# Patient Record
Sex: FEMALE | Race: WHITE | NOT HISPANIC OR LATINO | Employment: OTHER | URBAN - METROPOLITAN AREA
[De-identification: names, ages, dates, MRNs, and addresses within clinical notes are randomized per-mention and may not be internally consistent; named-entity substitution may affect disease eponyms.]

---

## 2023-03-13 LAB
THYROTROPIN (MIU/L) IN SER/PLAS BY DETECTION LIMIT <= 0.05 MIU/L: 4.01 MIU/L (ref 0.44–3.98)
THYROXINE (T4) FREE (NG/DL) IN SER/PLAS: 0.85 NG/DL (ref 0.61–1.12)

## 2023-04-18 ENCOUNTER — OFFICE VISIT (OUTPATIENT)
Dept: PRIMARY CARE | Facility: CLINIC | Age: 78
End: 2023-04-18
Payer: MEDICARE

## 2023-04-18 VITALS
HEART RATE: 91 BPM | DIASTOLIC BLOOD PRESSURE: 71 MMHG | BODY MASS INDEX: 38.23 KG/M2 | WEIGHT: 215.8 LBS | OXYGEN SATURATION: 95 % | SYSTOLIC BLOOD PRESSURE: 155 MMHG | TEMPERATURE: 98 F

## 2023-04-18 DIAGNOSIS — R60.0 BILATERAL LOWER EXTREMITY EDEMA: ICD-10-CM

## 2023-04-18 DIAGNOSIS — B35.4 TINEA CORPORIS: ICD-10-CM

## 2023-04-18 DIAGNOSIS — M25.511 CHRONIC RIGHT SHOULDER PAIN: Primary | ICD-10-CM

## 2023-04-18 DIAGNOSIS — G89.29 CHRONIC RIGHT SHOULDER PAIN: Primary | ICD-10-CM

## 2023-04-18 DIAGNOSIS — R06.02 SHORT OF BREATH ON EXERTION: ICD-10-CM

## 2023-04-18 PROCEDURE — 1160F RVW MEDS BY RX/DR IN RCRD: CPT | Performed by: NURSE PRACTITIONER

## 2023-04-18 PROCEDURE — 1159F MED LIST DOCD IN RCRD: CPT | Performed by: NURSE PRACTITIONER

## 2023-04-18 PROCEDURE — 99214 OFFICE O/P EST MOD 30 MIN: CPT | Performed by: NURSE PRACTITIONER

## 2023-04-18 PROCEDURE — 1036F TOBACCO NON-USER: CPT | Performed by: NURSE PRACTITIONER

## 2023-04-18 RX ORDER — ALBUTEROL SULFATE 0.83 MG/ML
SOLUTION RESPIRATORY (INHALATION)
COMMUNITY
Start: 2019-10-07

## 2023-04-18 RX ORDER — FLUTICASONE FUROATE, UMECLIDINIUM BROMIDE AND VILANTEROL TRIFENATATE 200; 62.5; 25 UG/1; UG/1; UG/1
POWDER RESPIRATORY (INHALATION)
COMMUNITY
Start: 2023-02-15 | End: 2024-01-02

## 2023-04-18 RX ORDER — TRIAMTERENE/HYDROCHLOROTHIAZID 37.5-25 MG
1 TABLET ORAL DAILY
COMMUNITY
Start: 2021-02-03 | End: 2023-05-09

## 2023-04-18 RX ORDER — ALBUTEROL SULFATE 90 UG/1
AEROSOL, METERED RESPIRATORY (INHALATION)
COMMUNITY
Start: 2017-11-30 | End: 2024-04-02 | Stop reason: ALTCHOICE

## 2023-04-18 RX ORDER — HYDROXYZINE HYDROCHLORIDE 25 MG/1
TABLET, FILM COATED ORAL
COMMUNITY
Start: 2022-06-20 | End: 2024-01-02 | Stop reason: WASHOUT

## 2023-04-18 RX ORDER — GLUCOSAMINE HCL 500 MG
TABLET ORAL
COMMUNITY
Start: 2021-02-03

## 2023-04-18 RX ORDER — LANOLIN ALCOHOL/MO/W.PET/CERES
1 CREAM (GRAM) TOPICAL DAILY
COMMUNITY

## 2023-04-18 RX ORDER — ACETAMINOPHEN 500 MG
TABLET ORAL
COMMUNITY
Start: 2021-02-03

## 2023-04-18 RX ORDER — OMEPRAZOLE 20 MG/1
TABLET, DELAYED RELEASE ORAL
COMMUNITY
End: 2023-08-02 | Stop reason: SDUPTHER

## 2023-04-18 RX ORDER — SERTRALINE HYDROCHLORIDE 50 MG/1
1 TABLET, FILM COATED ORAL DAILY
COMMUNITY
Start: 2017-01-19 | End: 2023-05-18

## 2023-04-18 RX ORDER — NYSTATIN 100000 [USP'U]/G
POWDER TOPICAL 2 TIMES DAILY
Qty: 60 G | Refills: 2 | Status: SHIPPED | OUTPATIENT
Start: 2023-04-18 | End: 2023-06-17

## 2023-04-18 RX ORDER — SIMVASTATIN 40 MG/1
1 TABLET, FILM COATED ORAL DAILY
COMMUNITY
Start: 2014-10-28 | End: 2023-08-31 | Stop reason: SDUPTHER

## 2023-04-18 ASSESSMENT — ENCOUNTER SYMPTOMS
NAUSEA: 0
SHORTNESS OF BREATH: 1
PALPITATIONS: 0
COUGH: 1
VOMITING: 0
WHEEZING: 0
CONSTIPATION: 0

## 2023-04-18 NOTE — PATIENT INSTRUCTIONS
Continue medications as prescribed.  Healthy diet and drink plenty of water.  Please schedule all necessary health screenings ophthalmology and dentist.    Follow-up in 3 months.  Call office any new concerns.  Nystop powder to [place] area   Lot #: [id number]

## 2023-04-18 NOTE — PROGRESS NOTES
Subjective   Patient ID: Alyssa Pruett is a 77 y.o. female who presents for Follow-up (Concerned about weight gain, knows is due for CPE).    HPI     Patient presents to clinic for follow-up visit.    Today patient complains of shortness of breathing on exertion and swelling in lower legs.  Patient with history of emphysema being followed by pulmonary medicine every 3 months.  Patient currently on oxygen at 3 L per nasal cannula.  She states she was advised by pulmonary medicine list to see cardiology but never followed up.  She denies chest pains.    Today patient also complains of chronic right shoulder pain for several years.  Denies trauma or injury to the shoulder but does have pain with movement of the shoulder.      Patient also complains of rash lower abdomen, under the apron.    Appetite Normal Bowel and bladder normal.    Patient states she had mammogram completed outside of  February 2023.  Results were normal.    Has had recent lab work January 2023      Review of Systems   Respiratory:  Positive for cough and shortness of breath. Negative for wheezing.         See HPI   Cardiovascular:  Positive for leg swelling. Negative for chest pain and palpitations.   Gastrointestinal:  Negative for constipation, nausea and vomiting.   Musculoskeletal:         See HPI       Objective   /71 (BP Location: Left arm, Patient Position: Sitting, BP Cuff Size: Large adult)   Pulse 91   Temp 36.7 °C (98 °F) (Temporal)   Wt 97.9 kg (215 lb 12.8 oz)   SpO2 95%   BMI 38.23 kg/m²     Physical Exam  Constitutional:       General: She is not in acute distress.  HENT:      Right Ear: There is impacted cerumen.      Left Ear: Tympanic membrane, ear canal and external ear normal. There is no impacted cerumen.      Mouth/Throat:      Mouth: Mucous membranes are moist.   Eyes:      Extraocular Movements: Extraocular movements intact.   Cardiovascular:      Rate and Rhythm: Normal rate and regular rhythm.   Pulmonary:       Effort: Pulmonary effort is normal. No respiratory distress.      Comments: Lung sounds clear but diminished throughout.  Wearing O2 at 2 L per nasal cannula.  Abdominal:      Palpations: Abdomen is soft.      Tenderness: There is no abdominal tenderness. There is no guarding or rebound.      Comments: Obese abdomen   Musculoskeletal:      Comments: Right shoulder decreased range of motion with extension of right arm.  Neurovascular intact   Skin:     General: Skin is warm and dry.      Comments: Fungal rash noted apron of the abdomen some erythema noted.  No signs of infection.   Neurological:      Mental Status: She is alert and oriented to person, place, and time.   Psychiatric:         Mood and Affect: Mood normal.         Assessment/Plan   Problem List Items Addressed This Visit    None  Visit Diagnoses       Chronic right shoulder pain    -  Primary    Relevant Orders    XR shoulder right 2+ views  Topical pain med as needed.    Short of breath on exertion        Relevant Orders    Referral to Cardiology    ECG 12 lead (Clinic Performed)-SR with ST wave elevation.  O2 at 2L/NC    Tinea corporis        Relevant Medications    nystatin (Mycostatin) 100,000 unit/gram powder    Bilateral lower extremity edema        Relevant Orders    Referral to Cardiology    ECG 12 lead (Clinic Performed)               Compression socks               Leg elevation

## 2023-04-20 ENCOUNTER — TELEPHONE (OUTPATIENT)
Dept: PRIMARY CARE | Facility: CLINIC | Age: 78
End: 2023-04-20
Payer: MEDICARE

## 2023-04-20 NOTE — TELEPHONE ENCOUNTER
Phone Patient discussed results of x-ray.  Patient will follow up with her established orthopedic provider.

## 2023-05-09 DIAGNOSIS — I10 HYPERTENSION, UNSPECIFIED TYPE: Primary | ICD-10-CM

## 2023-05-09 RX ORDER — TRIAMTERENE/HYDROCHLOROTHIAZID 37.5-25 MG
TABLET ORAL
Qty: 90 TABLET | Refills: 2 | Status: SHIPPED | OUTPATIENT
Start: 2023-05-09 | End: 2024-02-27

## 2023-08-02 DIAGNOSIS — K21.9 GASTROESOPHAGEAL REFLUX DISEASE WITHOUT ESOPHAGITIS: Primary | ICD-10-CM

## 2023-08-02 NOTE — TELEPHONE ENCOUNTER
Rx Refill Request Telephone Encounter    Name:  Alyssa Pruett  :  [id number]  Medication Name:  omeprazole DR 20 mg capsule  Specific Pharmacy location:    [facility] - [facility] [address]  [address]  [facility] [address]  Phone: [phone] Fax: [phone]  Date of last appointment:  2023  Date of next appointment:  None scheduled  Best number to reach patient:  [phone]

## 2023-08-04 RX ORDER — OMEPRAZOLE 20 MG/1
20 TABLET, DELAYED RELEASE ORAL DAILY
Qty: 90 TABLET | Refills: 0 | Status: SHIPPED | OUTPATIENT
Start: 2023-08-04 | End: 2023-10-20

## 2023-08-28 ENCOUNTER — TELEPHONE (OUTPATIENT)
Dept: PRIMARY CARE | Facility: CLINIC | Age: 78
End: 2023-08-28

## 2023-08-28 NOTE — TELEPHONE ENCOUNTER
Called pt to schedule annual medicare wellness and pt said she will call back after her apptointments this month

## 2023-08-31 DIAGNOSIS — E78.5 HYPERLIPIDEMIA, UNSPECIFIED HYPERLIPIDEMIA TYPE: Primary | ICD-10-CM

## 2023-08-31 RX ORDER — SIMVASTATIN 40 MG/1
40 TABLET, FILM COATED ORAL DAILY
Qty: 90 TABLET | Refills: 0 | Status: SHIPPED | OUTPATIENT
Start: 2023-08-31 | End: 2023-10-20

## 2023-08-31 NOTE — TELEPHONE ENCOUNTER
Rx Refill Request Telephone Encounter    Name:  Alyssa Pruett  :  [id number]  Medication Name:  simvastatin 40 mg tablet  Specific Pharmacy location:    [facility] - [facility] [address]  [address]  [facility] [address]  Phone: [phone] Fax: [phone]  Date of last appointment:  2023  Date of next appointment:  None scheduled  Best number to reach patient:  [phone] (home)

## 2023-10-02 DIAGNOSIS — F41.9 ANXIETY: ICD-10-CM

## 2023-10-02 RX ORDER — SERTRALINE HYDROCHLORIDE 50 MG/1
TABLET, FILM COATED ORAL
Qty: 90 TABLET | Refills: 1 | Status: SHIPPED | OUTPATIENT
Start: 2023-10-02

## 2023-10-03 ENCOUNTER — APPOINTMENT (OUTPATIENT)
Dept: CARDIOLOGY | Facility: HOSPITAL | Age: 78
End: 2023-10-03
Payer: MEDICARE

## 2023-10-06 ENCOUNTER — APPOINTMENT (OUTPATIENT)
Dept: CARDIOLOGY | Facility: HOSPITAL | Age: 78
End: 2023-10-06
Payer: MEDICARE

## 2023-10-06 ENCOUNTER — HOSPITAL ENCOUNTER (OUTPATIENT)
Dept: RADIOLOGY | Facility: HOSPITAL | Age: 78
End: 2023-10-06
Payer: MEDICARE

## 2023-10-06 ENCOUNTER — APPOINTMENT (OUTPATIENT)
Dept: RADIOLOGY | Facility: HOSPITAL | Age: 78
End: 2023-10-06
Payer: MEDICARE

## 2023-10-06 ENCOUNTER — HOSPITAL ENCOUNTER (OUTPATIENT)
Dept: CARDIOLOGY | Facility: CLINIC | Age: 78
Discharge: HOME | End: 2023-10-06
Payer: MEDICARE

## 2023-10-06 VITALS
SYSTOLIC BLOOD PRESSURE: 134 MMHG | BODY MASS INDEX: 38.45 KG/M2 | WEIGHT: 217 LBS | HEIGHT: 63 IN | DIASTOLIC BLOOD PRESSURE: 82 MMHG

## 2023-10-06 DIAGNOSIS — R06.00 DYSPNEA, UNSPECIFIED: ICD-10-CM

## 2023-10-06 DIAGNOSIS — R53.83 OTHER FATIGUE: ICD-10-CM

## 2023-10-08 LAB — EJECTION FRACTION APICAL 4 CHAMBER: 56.6

## 2023-10-19 DIAGNOSIS — K21.9 GASTROESOPHAGEAL REFLUX DISEASE WITHOUT ESOPHAGITIS: ICD-10-CM

## 2023-10-19 DIAGNOSIS — E78.5 HYPERLIPIDEMIA, UNSPECIFIED HYPERLIPIDEMIA TYPE: ICD-10-CM

## 2023-10-20 RX ORDER — OMEPRAZOLE 20 MG/1
CAPSULE, DELAYED RELEASE ORAL
Qty: 90 CAPSULE | Refills: 1 | Status: SHIPPED | OUTPATIENT
Start: 2023-10-20 | End: 2024-04-24 | Stop reason: SDUPTHER

## 2023-10-20 RX ORDER — SIMVASTATIN 40 MG/1
40 TABLET, FILM COATED ORAL DAILY
Qty: 90 TABLET | Refills: 1 | Status: SHIPPED | OUTPATIENT
Start: 2023-10-20 | End: 2024-04-17

## 2023-12-13 ENCOUNTER — TELEPHONE (OUTPATIENT)
Dept: PRIMARY CARE | Facility: CLINIC | Age: 78
End: 2023-12-13
Payer: MEDICARE

## 2023-12-21 PROBLEM — I10 HTN (HYPERTENSION): Status: ACTIVE | Noted: 2023-12-21

## 2023-12-21 PROBLEM — L30.9 DERMATITIS: Status: ACTIVE | Noted: 2023-12-21

## 2023-12-21 PROBLEM — G62.9 POLYNEUROPATHY: Status: ACTIVE | Noted: 2023-12-21

## 2023-12-21 PROBLEM — K22.70 BARRETT'S ESOPHAGUS: Status: ACTIVE | Noted: 2023-12-21

## 2023-12-21 PROBLEM — R60.0 EDEMA OF BOTH LEGS: Status: ACTIVE | Noted: 2023-12-21

## 2023-12-21 PROBLEM — F17.200 SMOKER: Status: ACTIVE | Noted: 2023-12-21

## 2023-12-21 PROBLEM — M79.604 PAIN OF RIGHT LOWER EXTREMITY: Status: ACTIVE | Noted: 2023-12-21

## 2023-12-21 PROBLEM — F41.9 ANXIETY: Status: ACTIVE | Noted: 2023-12-21

## 2023-12-21 PROBLEM — J43.9 COPD (CHRONIC OBSTRUCTIVE PULMONARY DISEASE) WITH EMPHYSEMA (MULTI): Status: ACTIVE | Noted: 2023-12-21

## 2023-12-21 PROBLEM — J31.0 CHRONIC RHINITIS: Status: ACTIVE | Noted: 2023-12-21

## 2023-12-21 PROBLEM — R30.0 DYSURIA: Status: ACTIVE | Noted: 2023-12-21

## 2023-12-21 PROBLEM — R14.0 ABDOMINAL BLOATING: Status: ACTIVE | Noted: 2023-12-21

## 2023-12-21 PROBLEM — N63.0 BREAST NODULE: Status: ACTIVE | Noted: 2023-12-21

## 2023-12-21 PROBLEM — E55.9 VITAMIN D DEFICIENCY: Status: ACTIVE | Noted: 2023-12-21

## 2023-12-21 PROBLEM — L29.9 ITCHING: Status: ACTIVE | Noted: 2023-12-21

## 2023-12-21 PROBLEM — F43.20 GRIEF REACTION: Status: ACTIVE | Noted: 2023-12-21

## 2023-12-21 PROBLEM — R73.9 HYPERGLYCEMIA: Status: ACTIVE | Noted: 2023-12-21

## 2023-12-21 PROBLEM — H90.3 BILATERAL SENSORINEURAL HEARING LOSS: Status: ACTIVE | Noted: 2023-12-21

## 2023-12-21 PROBLEM — R27.0 ATAXIA: Status: ACTIVE | Noted: 2023-12-21

## 2023-12-21 PROBLEM — F43.21 GRIEF REACTION: Status: ACTIVE | Noted: 2023-12-21

## 2023-12-21 PROBLEM — K21.9 GERD (GASTROESOPHAGEAL REFLUX DISEASE): Status: ACTIVE | Noted: 2023-12-21

## 2023-12-21 PROBLEM — L30.4 INTERTRIGO: Status: ACTIVE | Noted: 2023-12-21

## 2023-12-21 PROBLEM — H93.12: Status: ACTIVE | Noted: 2023-12-21

## 2023-12-21 PROBLEM — I72.0 CAROTID ANEURYSM, RIGHT (CMS-HCC): Status: ACTIVE | Noted: 2023-12-21

## 2023-12-21 PROBLEM — J44.9 COPD (CHRONIC OBSTRUCTIVE PULMONARY DISEASE) (MULTI): Chronic | Status: ACTIVE | Noted: 2023-12-21

## 2023-12-21 PROBLEM — E78.00 PURE HYPERCHOLESTEROLEMIA: Status: ACTIVE | Noted: 2023-12-21

## 2023-12-21 PROBLEM — R23.3 PETECHIAE: Status: ACTIVE | Noted: 2023-12-21

## 2023-12-21 PROBLEM — F32.A DEPRESSION: Status: ACTIVE | Noted: 2023-12-21

## 2023-12-21 PROBLEM — R06.00 DYSPNEA: Status: ACTIVE | Noted: 2023-12-21

## 2023-12-21 PROBLEM — E78.00 PURE HYPERCHOLESTEROLEMIA: Chronic | Status: ACTIVE | Noted: 2023-12-21

## 2023-12-21 PROBLEM — R74.8 ELEVATED LIVER ENZYMES: Status: ACTIVE | Noted: 2023-12-21

## 2023-12-21 PROBLEM — R53.83 FATIGUE: Status: ACTIVE | Noted: 2023-12-21

## 2023-12-21 PROBLEM — L91.8 SKIN TAG: Status: ACTIVE | Noted: 2023-12-21

## 2023-12-21 PROBLEM — I67.1 CEREBRAL ANEURYSM (HHS-HCC): Status: ACTIVE | Noted: 2023-12-21

## 2023-12-21 PROBLEM — M25.50 ARTHRALGIA: Status: ACTIVE | Noted: 2023-12-21

## 2023-12-21 PROBLEM — I72.9 ANEURYSM (CMS-HCC): Status: ACTIVE | Noted: 2023-12-21

## 2024-01-01 PROBLEM — I72.0 CAROTID ANEURYSM, RIGHT (CMS-HCC): Chronic | Status: ACTIVE | Noted: 2023-12-21

## 2024-01-01 PROBLEM — R60.0 EDEMA OF BOTH LEGS: Chronic | Status: ACTIVE | Noted: 2023-12-21

## 2024-01-01 PROBLEM — I10 HTN (HYPERTENSION): Chronic | Status: ACTIVE | Noted: 2023-12-21

## 2024-01-01 NOTE — PROGRESS NOTES
Referred by No ref. provider found    HPI I am seeing Jailyn in follow-up.  She continues to have shortness of breath.  Not very much activity.  Weight is up only 3 pounds over 6 months given the holidays.  Minimal lower extremity edema.    Past Medical History:  Problem List Items Addressed This Visit    None       Past Medical History:   Diagnosis Date    Candidal stomatitis     Thrush    Chronic obstructive pulmonary disease with (acute) exacerbation (CMS/[facility]) 10/22/2019    COPD exacerbation    Chronic obstructive pulmonary disease with (acute) exacerbation (CMS/[facility]) 11/06/2014    COPD with exacerbation    COPD (chronic obstructive pulmonary disease) (CMS/[facility]) 12/21/2023    On 3L O2    Dysphagia, unspecified     Dysphagia    Encounter for other screening for malignant neoplasm of breast 01/19/2017    Encounter for screening breast examination    Impacted cerumen, bilateral 08/23/2018    Impacted cerumen of both ears    Impacted cerumen, right ear 01/30/2017    Impacted cerumen of right ear    Low back pain, unspecified 11/02/2018    Acute right-sided low back pain without sciatica    Low back pain, unspecified 10/02/2018    Acute left-sided low back pain without sciatica    Nicotine dependence, unspecified, uncomplicated 11/06/2014    Smoker    Nicotine dependence, unspecified, uncomplicated 11/30/2017    Tobacco dependence    Nonscarring hair loss, unspecified 12/14/2018    Hair loss    Other specified symptoms and signs involving the circulatory and respiratory systems 10/07/2019    Chest congestion    Pain in right knee 12/14/2018    Acute pain of right knee    Pain in unspecified shoulder 06/05/2015    Shoulder pain    Personal history of other diseases of the musculoskeletal system and connective tissue     History of low back pain    Personal history of other diseases of the respiratory system 10/07/2019    History of acute bronchitis    Personal history of other diseases of the respiratory system      History of bronchitis    Personal history of other diseases of the respiratory system 2018    History of paranasal sinus congestion    Personal history of other diseases of the respiratory system 2017    History of pharyngitis    Personal history of other diseases of the respiratory system 2014    History of chronic obstructive lung disease    Personal history of other drug therapy 10/06/2014    History of influenza vaccination    Personal history of other endocrine, nutritional and metabolic disease 10/28/2014    History of hypercholesterolemia    Personal history of other endocrine, nutritional and metabolic disease     History of high cholesterol    Personal history of other infectious and parasitic diseases 2018    History of herpes zoster    Personal history of other infectious and parasitic diseases 2018    History of herpes zoster    Personal history of other medical treatment 2017    History of screening mammography    Personal history of other medical treatment 2021    History of screening mammography    Personal history of other specified conditions 2018    History of dizziness    Personal history of other specified conditions 2015    History of abdominal pain    Personal history of other specified conditions 2018    History of epistaxis    Personal history of other specified conditions     History of insomnia    Pure hypercholesterolemia 2023    PCP follows    Sudden idiopathic hearing loss, left ear 2017    Sudden hearing loss, left    Urinary tract infection, site not specified     UTI (lower urinary tract infection)             Past Surgical History:  She has a past surgical history that includes Foot surgery (2017); Colonoscopy (06/10/2021); Cataract extraction (2018); Other surgical history (2014);  section, classic (2014); and Knee surgery (2014).      Social History:  She reports that she  quit smoking about 7 years ago. Her smoking use included cigarettes. She smoked an average of 1 pack per day. She has never used smokeless tobacco. No history on file for alcohol use and drug use.    Family History:  No family history on file.     Allergies:  Patient has no known allergies.    Outpatient Medications:  Current Outpatient Medications   Medication Instructions    albuterol 2.5 mg /3 mL (0.083 %) nebulizer solution inhalation    ascorbic acid/bioflavonoids (TAYLOR-C PO) 1 tablet, oral, Daily    cholecalciferol (Vitamin D-3) 50 mcg (2,000 unit) capsule oral    cyanocobalamin (Vitamin B-12) 1,000 mcg tablet 1 tablet, oral, Daily    hydrOXYzine HCL (Atarax) 25 mg tablet oral    omeprazole (PriLOSEC) 20 mg DR capsule TAKE 1 CAPSULE EVERY DAY EARLY IN THE MORNING    sertraline (Zoloft) 50 mg tablet TAKE 1 TABLET EVERY DAY    simvastatin (ZOCOR) 40 mg, oral, Daily    Trelegy Ellipta 200-62.5-25 mcg blister with device     triamterene-hydrochlorothiazid (Maxzide-25) 37.5-25 mg tablet TAKE 1 TABLET EVERY DAY    ubidecarenone (coenzyme Q10) 100 mg tablet oral    Ventolin HFA 90 mcg/actuation inhaler inhalation, Every 4 hours RT        Last Recorded Vitals:  There were no vitals filed for this visit.    Physical Exam    Physical  Patient is alert and oriented x3.  HEENT is unremarkable mucous members are moist  Neck no JVP no bruits upstrokes are full no thyromegaly  Lungs are clear bilaterally.  No wheezing crackles or rales  Heart regular rhythm normal S1-S2 there is no S3 no murmurs are heard.  Abdomen is soft vessels are positive nontender nondistended no organomegaly no pulsatile masses  Extremities trace edema  Neuro is grossly nonfocal  Skin has no rashes     Last Labs:  CBC -  Lab Results   Component Value Date    WBC 6.6 01/27/2023    HGB 13.4 01/27/2023    HCT 41.8 01/27/2023    MCV 83 01/27/2023     01/27/2023       CMP -  Lab Results   Component Value Date    CALCIUM 9.3 01/27/2023    PROT 7.5  01/27/2023    ALBUMIN 4.4 01/27/2023    AST 68 (H) 01/27/2023    ALT 72 (H) 01/27/2023    ALKPHOS 59 01/27/2023    BILITOT 0.5 01/27/2023       LIPID PANEL -   Lab Results   Component Value Date    CHOL 162 01/27/2023    HDL 45.5 01/27/2023    CHHDL 3.6 01/27/2023    VLDL 28 01/27/2023    TRIG 139 01/27/2023       RENAL FUNCTION PANEL -   Lab Results   Component Value Date    K 3.8 01/27/2023       Lab Results   Component Value Date    HGBA1C 5.8 (A) 10/22/2021     Procedure    Echo 10/6/2023 EF 60% DDF      CT [05/24/2023]: No ev/of acute intrathoracic process. Emphysematous changes most conspicuous/severe in the upper lobes. Scattered areas of atelectasis/scarring, most conspicuous in the lung bases. No new areas of consolidation. No sizable effusion or pneumothorax. No new suspicious pulmonary nodule or mass.      PHARM NST [09/26/2019]: Normal â€“ 68%      ECHO [09/26/2019]: EF 60-65%. SD = impaired relaxation pattern.          Assessment/Plan   1. Shortness of breath. More likely not the home O2 requiring COPD. However, there is a strong family history premature coronary disease, she also has hyperlipidemia and a smoking history.  She has not yet had her regadenoson nuclear stress test completed.  This to be scheduled as late in the afternoon as possible.  Her echo does reveal normal LV function.  There is no evidence of pulmonary hypertension.      2. Lower extreme edema.  This is multifactorial I think in part related to the fact that she is in a wheelchair and not as active.  No clear pulmonary hypertension on her echo.  Today the edema is minimal.      3. Hyperlipidemia. Managed by primary care physician. Her LDL is 89. At this point this appears to be appropriate target for her.     Her echo was reviewed.  LV function normal.  No pulmonary hypertension.  Increasing activity however she can is important.  She will be ordered to have a regadenoson nuclear stress test.  She will call us 1 week afterwards to  review the results.  My plan is to see her back in approximately 6 months    Nayely Merrill MD     Instructions and follow up

## 2024-01-02 ENCOUNTER — OFFICE VISIT (OUTPATIENT)
Dept: CARDIOLOGY | Facility: CLINIC | Age: 79
End: 2024-01-02
Payer: MEDICARE

## 2024-01-02 VITALS
SYSTOLIC BLOOD PRESSURE: 156 MMHG | BODY MASS INDEX: 38.97 KG/M2 | DIASTOLIC BLOOD PRESSURE: 84 MMHG | OXYGEN SATURATION: 92 % | HEART RATE: 104 BPM | WEIGHT: 220 LBS

## 2024-01-02 DIAGNOSIS — E78.00 PURE HYPERCHOLESTEROLEMIA: Chronic | ICD-10-CM

## 2024-01-02 DIAGNOSIS — R60.0 EDEMA OF BOTH LEGS: Chronic | ICD-10-CM

## 2024-01-02 DIAGNOSIS — I10 PRIMARY HYPERTENSION: Primary | Chronic | ICD-10-CM

## 2024-01-02 DIAGNOSIS — F17.200 SMOKER: ICD-10-CM

## 2024-01-02 PROCEDURE — 99214 OFFICE O/P EST MOD 30 MIN: CPT | Performed by: INTERNAL MEDICINE

## 2024-01-02 PROCEDURE — 1159F MED LIST DOCD IN RCRD: CPT | Performed by: INTERNAL MEDICINE

## 2024-01-02 PROCEDURE — 3077F SYST BP >= 140 MM HG: CPT | Performed by: INTERNAL MEDICINE

## 2024-01-02 PROCEDURE — 3079F DIAST BP 80-89 MM HG: CPT | Performed by: INTERNAL MEDICINE

## 2024-01-02 PROCEDURE — 1160F RVW MEDS BY RX/DR IN RCRD: CPT | Performed by: INTERNAL MEDICINE

## 2024-01-02 PROCEDURE — 1036F TOBACCO NON-USER: CPT | Performed by: INTERNAL MEDICINE

## 2024-01-02 NOTE — PATIENT INSTRUCTIONS
1. Shortness of breath. More likely not the home O2 requiring COPD. However, there is a strong family history premature coronary disease, she also has hyperlipidemia and a smoking history.  She has not yet had her regadenoson nuclear stress test completed.  This to be scheduled as late in the afternoon as possible.  Her echo does reveal normal LV function.  There is no evidence of pulmonary hypertension.      2. Lower extreme edema.  This is multifactorial I think in part related to the fact that she is in a wheelchair and not as active.  No clear pulmonary hypertension on her echo.  Today the edema is minimal.      3. Hyperlipidemia. Managed by primary care physician. Her LDL is 89. At this point this appears to be appropriate target for her.     Her echo was reviewed.  LV function normal.  No pulmonary hypertension.  Increasing activity however she can is important.  She will be ordered to have a regadenoson nuclear stress test.  She will call us 1 week afterwards to review the results.  My plan is to see her back in approximately 6 months

## 2024-01-12 ENCOUNTER — TELEPHONE (OUTPATIENT)
Dept: PRIMARY CARE | Facility: CLINIC | Age: 79
End: 2024-01-12
Payer: MEDICARE

## 2024-02-06 ENCOUNTER — HOSPITAL ENCOUNTER (OUTPATIENT)
Dept: RADIOLOGY | Facility: CLINIC | Age: 79
Discharge: HOME | End: 2024-02-06
Payer: MEDICARE

## 2024-02-06 ENCOUNTER — HOSPITAL ENCOUNTER (OUTPATIENT)
Dept: CARDIOLOGY | Facility: CLINIC | Age: 79
Discharge: HOME | End: 2024-02-06
Payer: MEDICARE

## 2024-02-06 DIAGNOSIS — R06.00 DYSPNEA: Primary | ICD-10-CM

## 2024-02-06 DIAGNOSIS — I10 HTN (HYPERTENSION): Chronic | ICD-10-CM

## 2024-02-06 DIAGNOSIS — R53.83 OTHER FATIGUE: ICD-10-CM

## 2024-02-06 DIAGNOSIS — E55.9 VITAMIN D DEFICIENCY: ICD-10-CM

## 2024-02-06 DIAGNOSIS — I10 HTN (HYPERTENSION): Primary | Chronic | ICD-10-CM

## 2024-02-06 DIAGNOSIS — R06.00 DYSPNEA, UNSPECIFIED: ICD-10-CM

## 2024-02-06 DIAGNOSIS — F17.200 SMOKER: ICD-10-CM

## 2024-02-06 DIAGNOSIS — R06.02 SHORTNESS OF BREATH: ICD-10-CM

## 2024-02-06 PROCEDURE — 78452 HT MUSCLE IMAGE SPECT MULT: CPT | Performed by: INTERNAL MEDICINE

## 2024-02-06 PROCEDURE — 2500000004 HC RX 250 GENERAL PHARMACY W/ HCPCS (ALT 636 FOR OP/ED): Performed by: INTERNAL MEDICINE

## 2024-02-06 PROCEDURE — 93017 CV STRESS TEST TRACING ONLY: CPT

## 2024-02-06 PROCEDURE — 93016 CV STRESS TEST SUPVJ ONLY: CPT | Performed by: INTERNAL MEDICINE

## 2024-02-06 PROCEDURE — 93017 CV STRESS TEST TRACING ONLY: CPT | Mod: MUE

## 2024-02-06 PROCEDURE — 78452 HT MUSCLE IMAGE SPECT MULT: CPT

## 2024-02-06 RX ORDER — REGADENOSON 0.08 MG/ML
0.4 INJECTION, SOLUTION INTRAVENOUS ONCE
Status: COMPLETED | OUTPATIENT
Start: 2024-02-06 | End: 2024-02-06

## 2024-02-06 RX ADMIN — REGADENOSON 0.4 MG: 0.4 INJECTION INTRAVENOUS at 12:22

## 2024-02-25 DIAGNOSIS — I10 HYPERTENSION, UNSPECIFIED TYPE: ICD-10-CM

## 2024-02-27 RX ORDER — TRIAMTERENE/HYDROCHLOROTHIAZID 37.5-25 MG
TABLET ORAL
Qty: 90 TABLET | Refills: 3 | Status: SHIPPED | OUTPATIENT
Start: 2024-02-27

## 2024-04-02 ENCOUNTER — OFFICE VISIT (OUTPATIENT)
Dept: PRIMARY CARE | Facility: CLINIC | Age: 79
End: 2024-04-02
Payer: MEDICARE

## 2024-04-02 VITALS
HEART RATE: 110 BPM | OXYGEN SATURATION: 94 % | BODY MASS INDEX: 38.8 KG/M2 | HEIGHT: 63 IN | SYSTOLIC BLOOD PRESSURE: 142 MMHG | WEIGHT: 219 LBS | DIASTOLIC BLOOD PRESSURE: 68 MMHG

## 2024-04-02 DIAGNOSIS — R06.02 SHORT OF BREATH ON EXERTION: ICD-10-CM

## 2024-04-02 DIAGNOSIS — J44.9 CHRONIC OBSTRUCTIVE PULMONARY DISEASE, UNSPECIFIED COPD TYPE (MULTI): Chronic | ICD-10-CM

## 2024-04-02 DIAGNOSIS — Z13.31 DEPRESSION SCREENING: ICD-10-CM

## 2024-04-02 DIAGNOSIS — R26.89 BALANCE DISORDER: ICD-10-CM

## 2024-04-02 DIAGNOSIS — R73.03 PREDIABETES: ICD-10-CM

## 2024-04-02 DIAGNOSIS — L30.9 ECZEMA, UNSPECIFIED TYPE: ICD-10-CM

## 2024-04-02 DIAGNOSIS — I10 PRIMARY HYPERTENSION: Chronic | ICD-10-CM

## 2024-04-02 DIAGNOSIS — Z00.00 MEDICARE ANNUAL WELLNESS VISIT, SUBSEQUENT: Primary | ICD-10-CM

## 2024-04-02 DIAGNOSIS — M25.511 CHRONIC RIGHT SHOULDER PAIN: ICD-10-CM

## 2024-04-02 DIAGNOSIS — G89.29 CHRONIC RIGHT SHOULDER PAIN: ICD-10-CM

## 2024-04-02 DIAGNOSIS — E66.01 CLASS 2 SEVERE OBESITY DUE TO EXCESS CALORIES WITH SERIOUS COMORBIDITY AND BODY MASS INDEX (BMI) OF 38.0 TO 38.9 IN ADULT (MULTI): ICD-10-CM

## 2024-04-02 DIAGNOSIS — Z13.29 THYROID DISORDER SCREENING: ICD-10-CM

## 2024-04-02 PROCEDURE — 1124F ACP DISCUSS-NO DSCNMKR DOCD: CPT | Performed by: STUDENT IN AN ORGANIZED HEALTH CARE EDUCATION/TRAINING PROGRAM

## 2024-04-02 PROCEDURE — 3077F SYST BP >= 140 MM HG: CPT | Performed by: STUDENT IN AN ORGANIZED HEALTH CARE EDUCATION/TRAINING PROGRAM

## 2024-04-02 PROCEDURE — G0444 DEPRESSION SCREEN ANNUAL: HCPCS | Performed by: STUDENT IN AN ORGANIZED HEALTH CARE EDUCATION/TRAINING PROGRAM

## 2024-04-02 PROCEDURE — 99214 OFFICE O/P EST MOD 30 MIN: CPT | Performed by: STUDENT IN AN ORGANIZED HEALTH CARE EDUCATION/TRAINING PROGRAM

## 2024-04-02 PROCEDURE — 1160F RVW MEDS BY RX/DR IN RCRD: CPT | Performed by: STUDENT IN AN ORGANIZED HEALTH CARE EDUCATION/TRAINING PROGRAM

## 2024-04-02 PROCEDURE — G0439 PPPS, SUBSEQ VISIT: HCPCS | Performed by: STUDENT IN AN ORGANIZED HEALTH CARE EDUCATION/TRAINING PROGRAM

## 2024-04-02 PROCEDURE — 1159F MED LIST DOCD IN RCRD: CPT | Performed by: STUDENT IN AN ORGANIZED HEALTH CARE EDUCATION/TRAINING PROGRAM

## 2024-04-02 PROCEDURE — 3078F DIAST BP <80 MM HG: CPT | Performed by: STUDENT IN AN ORGANIZED HEALTH CARE EDUCATION/TRAINING PROGRAM

## 2024-04-02 PROCEDURE — 1170F FXNL STATUS ASSESSED: CPT | Performed by: STUDENT IN AN ORGANIZED HEALTH CARE EDUCATION/TRAINING PROGRAM

## 2024-04-02 RX ORDER — CLOBETASOL PROPIONATE 0.5 MG/G
OINTMENT TOPICAL 2 TIMES DAILY
Qty: 15 G | Refills: 2 | Status: SHIPPED | OUTPATIENT
Start: 2024-04-02 | End: 2025-04-02

## 2024-04-02 RX ORDER — BUDESONIDE, GLYCOPYRROLATE, AND FORMOTEROL FUMARATE 160; 9; 4.8 UG/1; UG/1; UG/1
AEROSOL, METERED RESPIRATORY (INHALATION)
COMMUNITY
Start: 2023-07-03

## 2024-04-02 ASSESSMENT — PATIENT HEALTH QUESTIONNAIRE - PHQ9
2. FEELING DOWN, DEPRESSED OR HOPELESS: NOT AT ALL
SUM OF ALL RESPONSES TO PHQ9 QUESTIONS 1 AND 2: 0
1. LITTLE INTEREST OR PLEASURE IN DOING THINGS: NOT AT ALL

## 2024-04-02 ASSESSMENT — ACTIVITIES OF DAILY LIVING (ADL)
GROCERY_SHOPPING: NEEDS ASSISTANCE
MANAGING_FINANCES: INDEPENDENT
DRESSING: INDEPENDENT
BATHING: INDEPENDENT
TAKING_MEDICATION: INDEPENDENT
DOING_HOUSEWORK: NEEDS ASSISTANCE

## 2024-04-02 NOTE — PROGRESS NOTES
Subjective   Patient ID: Alyssa Pruett is a 78 y.o. female who presents for Medicare Annual Wellness Visit Subsequent (Medicare annual wellness visit, would like to get established /Needs bloodwork , due for bone density and mammogram).  Establishing care.     Says she is miserable when she tries to move. Feels short of breath. Is on oxygen, breztri, and albuterol.     Sees pulmonology regularly. Started NAC at recommendation of family member.     7-8 years ago she lost her hearing overnight. Has had balance issues since.     Has 2 brain aneurysms that were found incidentally. Has not followed up on these as she states she knows this will not change anything and that they are not repairable.     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     Says she sleeps well.     Using a walker.     Wants to incorporate more walking.     No other concerns today.         Review of Systems   Constitutional:  Positive for fatigue.   HENT: Negative.     Respiratory:  Positive for shortness of breath.    Cardiovascular: Negative.    Gastrointestinal: Negative.    Musculoskeletal:  Positive for arthralgias.   Neurological: Negative.    Psychiatric/Behavioral:  Negative for sleep disturbance.    All other systems reviewed and are negative.      Objective   Physical Exam  Vitals reviewed.   Constitutional:       Appearance: Normal appearance.   HENT:      Head: Normocephalic.      Right Ear: External ear normal.      Left Ear: External ear normal.      Mouth/Throat:      Mouth: Mucous membranes are moist.   Eyes:      Pupils: Pupils are equal, round, and reactive to light.   Cardiovascular:      Rate and Rhythm: Normal rate and regular rhythm.   Pulmonary:      Effort: Pulmonary effort is normal.      Breath sounds: Wheezing present. No rhonchi.   Abdominal:      Palpations: Abdomen is soft.   Musculoskeletal:         General: Normal range of motion.   Skin:     General: Skin is warm and dry.   Neurological:      General: No focal  deficit present.      Mental Status: She is alert. Mental status is at baseline.   Psychiatric:         Mood and Affect: Mood normal.         Behavior: Behavior normal.         Body mass index is 38.79 kg/m².      Current Outpatient Medications:     albuterol 2.5 mg /3 mL (0.083 %) nebulizer solution, Inhale., Disp: , Rfl:     ascorbic acid/bioflavonoids (TAYLOR-C PO), Take 1 tablet by mouth once daily., Disp: , Rfl:     budesonide-glycopyr-formoterol (Breztri Aerosphere) 160-9-4.8 mcg/actuation HFA aerosol inhaler, , Disp: , Rfl:     cholecalciferol (Vitamin D-3) 50 mcg (2,000 unit) capsule, Take by mouth., Disp: , Rfl:     cyanocobalamin (Vitamin B-12) 1,000 mcg tablet, Take 1 tablet (1,000 mcg) by mouth once daily., Disp: , Rfl:     omeprazole (PriLOSEC) 20 mg DR capsule, TAKE 1 CAPSULE EVERY DAY EARLY IN THE MORNING, Disp: 90 capsule, Rfl: 1    sertraline (Zoloft) 50 mg tablet, TAKE 1 TABLET EVERY DAY, Disp: 90 tablet, Rfl: 1    simvastatin (Zocor) 40 mg tablet, TAKE 1 TABLET (40 MG) BY MOUTH ONCE DAILY., Disp: 90 tablet, Rfl: 1    triamterene-hydrochlorothiazid (Maxzide-25) 37.5-25 mg tablet, TAKE 1 TABLET EVERY DAY, Disp: 90 tablet, Rfl: 3    ubidecarenone (coenzyme Q10) 100 mg tablet, Take by mouth., Disp: , Rfl:     ALBUTEROL SULFATE INHL, Inhale 90 mcg., Disp: , Rfl:     clobetasol (Temovate) 0.05 % ointment, Apply topically 2 times a day., Disp: 15 g, Rfl: 2      Assessment/Plan   Diagnoses and all orders for this visit:  Medicare annual wellness visit, subsequent  Comments:  UTD on vaccines  discuss DEXA next visit  Eczema, unspecified type  -     clobetasol (Temovate) 0.05 % ointment; Apply topically 2 times a day.  Primary hypertension  -     Comprehensive metabolic panel; Future  -     CBC and Auto Differential; Future  Short of breath on exertion  Comments:  discussed slowly increasing exercise tolerance  continue following with pulmonologist  Thyroid disorder screening  -     Thyroid Peroxidase (TPO)  Antibody; Future  -     Tsh With Reflex To Free T4 If Abnormal; Future  Prediabetes  -     Hemoglobin A1c; Future  Depression screening  Comments:  PHQ-2 completed on rooming  Class 2 severe obesity due to excess calories with serious comorbidity and body mass index (BMI) of 38.0 to 38.9 in adult (CMS/[facility])  Chronic right shoulder pain  Chronic obstructive pulmonary disease, unspecified COPD type (CMS/[facility])  Comments:  on optimal therapy  chronic supplemental oxygen dependence  continue seeing pulm  Balance disorder  Comments:  did balance PT  uses walker to ambulate    Follow up in 3 months       Farnaz Helm DO 04/05/24 10:53 PM

## 2024-04-04 ASSESSMENT — PATIENT HEALTH QUESTIONNAIRE - PHQ9
2. FEELING DOWN, DEPRESSED OR HOPELESS: NOT AT ALL
1. LITTLE INTEREST OR PLEASURE IN DOING THINGS: NOT AT ALL
SUM OF ALL RESPONSES TO PHQ9 QUESTIONS 1 AND 2: 0

## 2024-04-05 ASSESSMENT — ENCOUNTER SYMPTOMS
ARTHRALGIAS: 1
FATIGUE: 1
SLEEP DISTURBANCE: 0
GASTROINTESTINAL NEGATIVE: 1
SHORTNESS OF BREATH: 1
CARDIOVASCULAR NEGATIVE: 1
NEUROLOGICAL NEGATIVE: 1

## 2024-04-06 ENCOUNTER — LAB (OUTPATIENT)
Dept: LAB | Facility: LAB | Age: 79
End: 2024-04-06
Payer: MEDICARE

## 2024-04-06 DIAGNOSIS — D64.9 ANEMIA, UNSPECIFIED TYPE: ICD-10-CM

## 2024-04-06 DIAGNOSIS — I10 PRIMARY HYPERTENSION: Chronic | ICD-10-CM

## 2024-04-06 DIAGNOSIS — R73.03 PREDIABETES: ICD-10-CM

## 2024-04-06 DIAGNOSIS — Z13.29 THYROID DISORDER SCREENING: ICD-10-CM

## 2024-04-06 LAB
ALBUMIN SERPL BCP-MCNC: 4.3 G/DL (ref 3.4–5)
ALP SERPL-CCNC: 62 U/L (ref 33–136)
ALT SERPL W P-5'-P-CCNC: 46 U/L (ref 7–45)
ANION GAP SERPL CALC-SCNC: 15 MMOL/L (ref 10–20)
AST SERPL W P-5'-P-CCNC: 46 U/L (ref 9–39)
BASOPHILS # BLD AUTO: 0.05 X10*3/UL (ref 0–0.1)
BASOPHILS NFR BLD AUTO: 0.7 %
BILIRUB SERPL-MCNC: 0.4 MG/DL (ref 0–1.2)
BUN SERPL-MCNC: 14 MG/DL (ref 6–23)
CALCIUM SERPL-MCNC: 9.8 MG/DL (ref 8.6–10.6)
CHLORIDE SERPL-SCNC: 99 MMOL/L (ref 98–107)
CO2 SERPL-SCNC: 31 MMOL/L (ref 21–32)
CREAT SERPL-MCNC: 0.92 MG/DL (ref 0.5–1.05)
EGFRCR SERPLBLD CKD-EPI 2021: 63 ML/MIN/1.73M*2
EOSINOPHIL # BLD AUTO: 0.09 X10*3/UL (ref 0–0.4)
EOSINOPHIL NFR BLD AUTO: 1.3 %
ERYTHROCYTE [DISTWIDTH] IN BLOOD BY AUTOMATED COUNT: 15.7 % (ref 11.5–14.5)
EST. AVERAGE GLUCOSE BLD GHB EST-MCNC: 134 MG/DL
GLUCOSE SERPL-MCNC: 152 MG/DL (ref 74–99)
HBA1C MFR BLD: 6.3 %
HCT VFR BLD AUTO: 35.9 % (ref 36–46)
HGB BLD-MCNC: 10.9 G/DL (ref 12–16)
IMM GRANULOCYTES # BLD AUTO: 0.01 X10*3/UL (ref 0–0.5)
IMM GRANULOCYTES NFR BLD AUTO: 0.1 % (ref 0–0.9)
LYMPHOCYTES # BLD AUTO: 1.19 X10*3/UL (ref 0.8–3)
LYMPHOCYTES NFR BLD AUTO: 16.9 %
MCH RBC QN AUTO: 22.2 PG (ref 26–34)
MCHC RBC AUTO-ENTMCNC: 30.4 G/DL (ref 32–36)
MCV RBC AUTO: 73 FL (ref 80–100)
MONOCYTES # BLD AUTO: 0.39 X10*3/UL (ref 0.05–0.8)
MONOCYTES NFR BLD AUTO: 5.5 %
NEUTROPHILS # BLD AUTO: 5.32 X10*3/UL (ref 1.6–5.5)
NEUTROPHILS NFR BLD AUTO: 75.5 %
NRBC BLD-RTO: 0 /100 WBCS (ref 0–0)
PLATELET # BLD AUTO: 224 X10*3/UL (ref 150–450)
POTASSIUM SERPL-SCNC: 4.1 MMOL/L (ref 3.5–5.3)
PROT SERPL-MCNC: 7.1 G/DL (ref 6.4–8.2)
RBC # BLD AUTO: 4.9 X10*6/UL (ref 4–5.2)
SODIUM SERPL-SCNC: 141 MMOL/L (ref 136–145)
T4 FREE SERPL-MCNC: 1.01 NG/DL (ref 0.78–1.48)
THYROPEROXIDASE AB SERPL-ACNC: 50 IU/ML
TSH SERPL-ACNC: 4.88 MIU/L (ref 0.44–3.98)
WBC # BLD AUTO: 7.1 X10*3/UL (ref 4.4–11.3)

## 2024-04-06 PROCEDURE — 86376 MICROSOMAL ANTIBODY EACH: CPT

## 2024-04-06 PROCEDURE — 84443 ASSAY THYROID STIM HORMONE: CPT

## 2024-04-06 PROCEDURE — 83036 HEMOGLOBIN GLYCOSYLATED A1C: CPT

## 2024-04-06 PROCEDURE — 85025 COMPLETE CBC W/AUTO DIFF WBC: CPT

## 2024-04-06 PROCEDURE — 80053 COMPREHEN METABOLIC PANEL: CPT

## 2024-04-06 PROCEDURE — 84439 ASSAY OF FREE THYROXINE: CPT

## 2024-04-06 PROCEDURE — 83540 ASSAY OF IRON: CPT

## 2024-04-06 PROCEDURE — 83550 IRON BINDING TEST: CPT

## 2024-04-06 PROCEDURE — 36415 COLL VENOUS BLD VENIPUNCTURE: CPT

## 2024-04-09 ENCOUNTER — PATIENT MESSAGE (OUTPATIENT)
Dept: PRIMARY CARE | Facility: CLINIC | Age: 79
End: 2024-04-09
Payer: MEDICARE

## 2024-04-09 DIAGNOSIS — D64.9 ANEMIA, UNSPECIFIED TYPE: Primary | ICD-10-CM

## 2024-04-09 DIAGNOSIS — Z12.31 BREAST CANCER SCREENING BY MAMMOGRAM: ICD-10-CM

## 2024-04-09 LAB
IRON SATN MFR SERPL: ABNORMAL %
IRON SERPL-MCNC: 40 UG/DL (ref 35–150)
TIBC SERPL-MCNC: ABNORMAL UG/DL
UIBC SERPL-MCNC: >450 UG/DL (ref 110–370)

## 2024-04-24 ENCOUNTER — PATIENT MESSAGE (OUTPATIENT)
Dept: PRIMARY CARE | Facility: CLINIC | Age: 79
End: 2024-04-24
Payer: MEDICARE

## 2024-04-24 DIAGNOSIS — K21.9 GASTROESOPHAGEAL REFLUX DISEASE WITHOUT ESOPHAGITIS: ICD-10-CM

## 2024-04-24 RX ORDER — OMEPRAZOLE 20 MG/1
CAPSULE, DELAYED RELEASE ORAL
Qty: 90 CAPSULE | Refills: 1 | Status: SHIPPED | OUTPATIENT
Start: 2024-04-24

## 2024-04-24 NOTE — TELEPHONE ENCOUNTER
From: Alyssa Pruett  To: Farnaz Helm  Sent: 4/24/2024 10:10 AM EDT  Subject: Medication refill    Good Morning! I need a refill authorization for this medication! omeprazole 20 mg DR capsule  [facility]  Thank you!

## 2024-06-17 PROBLEM — G47.33 OBSTRUCTIVE SLEEP APNEA: Status: ACTIVE | Noted: 2024-06-17

## 2024-06-17 PROBLEM — I70.0 ATHEROSCLEROSIS OF AORTA (CMS-HCC): Status: ACTIVE | Noted: 2023-05-24

## 2024-06-17 PROBLEM — M19.011 PRIMARY OSTEOARTHRITIS, RIGHT SHOULDER: Status: ACTIVE | Noted: 2023-04-18

## 2024-06-17 PROBLEM — M79.601 CHRONIC PAIN OF RIGHT UPPER EXTREMITY: Status: ACTIVE | Noted: 2023-04-18

## 2024-06-17 PROBLEM — R09.89 PULMONARY CONGESTION: Status: ACTIVE | Noted: 2024-06-17

## 2024-06-17 PROBLEM — N39.0 LOWER URINARY TRACT INFECTIOUS DISEASE: Status: ACTIVE | Noted: 2024-06-17

## 2024-06-17 PROBLEM — G89.29 CHRONIC PAIN OF RIGHT UPPER EXTREMITY: Status: ACTIVE | Noted: 2023-04-18

## 2024-06-17 PROBLEM — R26.89 IMPAIRMENT OF BALANCE: Status: ACTIVE | Noted: 2024-06-17

## 2024-06-17 PROBLEM — R09.81 CONGESTION OF PARANASAL SINUS: Status: ACTIVE | Noted: 2024-06-17

## 2024-06-17 PROBLEM — J20.9 ACUTE BRONCHITIS: Status: ACTIVE | Noted: 2024-06-17

## 2024-06-17 PROBLEM — R13.10 DYSPHAGIA: Status: ACTIVE | Noted: 2024-06-17

## 2024-06-17 PROBLEM — R42 DIZZINESS: Status: ACTIVE | Noted: 2024-06-17

## 2024-06-17 PROBLEM — R91.1 SOLITARY PULMONARY NODULE: Status: ACTIVE | Noted: 2023-05-24

## 2024-06-17 PROBLEM — B37.0 CANDIDIASIS OF MOUTH: Status: ACTIVE | Noted: 2024-06-17

## 2024-07-01 PROBLEM — R23.3 PETECHIAE: Status: RESOLVED | Noted: 2023-12-21 | Resolved: 2024-07-01

## 2024-07-01 PROBLEM — R09.81 CONGESTION OF PARANASAL SINUS: Status: RESOLVED | Noted: 2024-06-17 | Resolved: 2024-07-01

## 2024-07-01 PROBLEM — L29.9 ITCHING: Status: RESOLVED | Noted: 2023-12-21 | Resolved: 2024-07-01

## 2024-07-01 PROBLEM — J31.0 CHRONIC RHINITIS: Status: RESOLVED | Noted: 2023-12-21 | Resolved: 2024-07-01

## 2024-07-01 PROBLEM — F43.21 GRIEF REACTION: Status: RESOLVED | Noted: 2023-12-21 | Resolved: 2024-07-01

## 2024-07-01 PROBLEM — H90.3 BILATERAL SENSORINEURAL HEARING LOSS: Status: RESOLVED | Noted: 2023-12-21 | Resolved: 2024-07-01

## 2024-07-01 PROBLEM — G89.29 CHRONIC PAIN OF RIGHT UPPER EXTREMITY: Status: RESOLVED | Noted: 2023-04-18 | Resolved: 2024-07-01

## 2024-07-01 PROBLEM — B37.0 CANDIDIASIS OF MOUTH: Status: RESOLVED | Noted: 2024-06-17 | Resolved: 2024-07-01

## 2024-07-01 PROBLEM — K21.9 GERD (GASTROESOPHAGEAL REFLUX DISEASE): Status: RESOLVED | Noted: 2023-12-21 | Resolved: 2024-07-01

## 2024-07-01 PROBLEM — G47.33 OBSTRUCTIVE SLEEP APNEA: Chronic | Status: ACTIVE | Noted: 2024-06-17

## 2024-07-01 PROBLEM — F43.20 GRIEF REACTION: Status: RESOLVED | Noted: 2023-12-21 | Resolved: 2024-07-01

## 2024-07-01 PROBLEM — M79.601 CHRONIC PAIN OF RIGHT UPPER EXTREMITY: Status: RESOLVED | Noted: 2023-04-18 | Resolved: 2024-07-01

## 2024-07-01 PROBLEM — R06.02 SHORTNESS OF BREATH: Status: ACTIVE | Noted: 2023-12-21

## 2024-07-01 PROBLEM — N63.0 BREAST NODULE: Status: RESOLVED | Noted: 2023-12-21 | Resolved: 2024-07-01

## 2024-07-01 PROBLEM — L30.4 INTERTRIGO: Status: RESOLVED | Noted: 2023-12-21 | Resolved: 2024-07-01

## 2024-07-01 PROBLEM — L91.8 SKIN TAG: Status: RESOLVED | Noted: 2023-12-21 | Resolved: 2024-07-01

## 2024-07-01 PROBLEM — R74.8 ELEVATED LIVER ENZYMES: Status: RESOLVED | Noted: 2023-12-21 | Resolved: 2024-07-01

## 2024-07-01 PROBLEM — L30.9 DERMATITIS: Status: RESOLVED | Noted: 2023-12-21 | Resolved: 2024-07-01

## 2024-07-01 PROBLEM — H93.12 TINNITUS OF LEFT EAR: Status: RESOLVED | Noted: 2023-12-21 | Resolved: 2024-07-01

## 2024-07-01 PROBLEM — M25.50 ARTHRALGIA: Status: RESOLVED | Noted: 2023-12-21 | Resolved: 2024-07-01

## 2024-07-01 PROBLEM — R14.0 ABDOMINAL BLOATING: Status: RESOLVED | Noted: 2023-12-21 | Resolved: 2024-07-01

## 2024-07-01 PROBLEM — N39.0 LOWER URINARY TRACT INFECTIOUS DISEASE: Status: RESOLVED | Noted: 2024-06-17 | Resolved: 2024-07-01

## 2024-07-01 PROBLEM — R30.0 DYSURIA: Status: RESOLVED | Noted: 2023-12-21 | Resolved: 2024-07-01

## 2024-07-02 ENCOUNTER — APPOINTMENT (OUTPATIENT)
Dept: CARDIOLOGY | Facility: CLINIC | Age: 79
End: 2024-07-02
Payer: MEDICARE

## 2024-10-02 ENCOUNTER — APPOINTMENT (OUTPATIENT)
Dept: PRIMARY CARE | Facility: CLINIC | Age: 79
End: 2024-10-02
Payer: MEDICARE

## 2024-10-02 VITALS
DIASTOLIC BLOOD PRESSURE: 70 MMHG | OXYGEN SATURATION: 95 % | WEIGHT: 206 LBS | HEIGHT: 63 IN | SYSTOLIC BLOOD PRESSURE: 140 MMHG | BODY MASS INDEX: 36.5 KG/M2 | HEART RATE: 89 BPM

## 2024-10-02 DIAGNOSIS — R06.02 SHORT OF BREATH ON EXERTION: ICD-10-CM

## 2024-10-02 DIAGNOSIS — I10 HYPERTENSION, UNSPECIFIED TYPE: ICD-10-CM

## 2024-10-02 DIAGNOSIS — L29.9 PRURITUS: ICD-10-CM

## 2024-10-02 DIAGNOSIS — E66.812 CLASS 2 SEVERE OBESITY WITH SERIOUS COMORBIDITY AND BODY MASS INDEX (BMI) OF 36.0 TO 36.9 IN ADULT, UNSPECIFIED OBESITY TYPE: ICD-10-CM

## 2024-10-02 DIAGNOSIS — L85.3 DRY SKIN DERMATITIS: Primary | ICD-10-CM

## 2024-10-02 DIAGNOSIS — K21.9 GASTROESOPHAGEAL REFLUX DISEASE WITHOUT ESOPHAGITIS: ICD-10-CM

## 2024-10-02 DIAGNOSIS — T78.40XS ALLERGY, SEQUELA: ICD-10-CM

## 2024-10-02 DIAGNOSIS — Z23 ENCOUNTER FOR IMMUNIZATION: ICD-10-CM

## 2024-10-02 DIAGNOSIS — L30.9 ECZEMA, UNSPECIFIED TYPE: ICD-10-CM

## 2024-10-02 DIAGNOSIS — I70.0 ATHEROSCLEROSIS OF AORTA (CMS-HCC): ICD-10-CM

## 2024-10-02 DIAGNOSIS — Z13.29 THYROID DISORDER SCREENING: ICD-10-CM

## 2024-10-02 DIAGNOSIS — E66.01 CLASS 2 SEVERE OBESITY WITH SERIOUS COMORBIDITY AND BODY MASS INDEX (BMI) OF 36.0 TO 36.9 IN ADULT, UNSPECIFIED OBESITY TYPE: ICD-10-CM

## 2024-10-02 DIAGNOSIS — E61.1 LOW IRON: ICD-10-CM

## 2024-10-02 DIAGNOSIS — J30.2 SEASONAL ALLERGIES: ICD-10-CM

## 2024-10-02 DIAGNOSIS — F32.5 MAJOR DEPRESSIVE DISORDER, SINGLE EPISODE, IN FULL REMISSION (CMS-HCC): ICD-10-CM

## 2024-10-02 DIAGNOSIS — L98.9 SKIN LESIONS: ICD-10-CM

## 2024-10-02 DIAGNOSIS — J44.9 CHRONIC OBSTRUCTIVE PULMONARY DISEASE, UNSPECIFIED COPD TYPE (MULTI): Chronic | ICD-10-CM

## 2024-10-02 DIAGNOSIS — R73.9 HYPERGLYCEMIA: ICD-10-CM

## 2024-10-02 LAB
ALBUMIN SERPL BCP-MCNC: 4.3 G/DL (ref 3.4–5)
ALP SERPL-CCNC: 62 U/L (ref 33–136)
ALT SERPL W P-5'-P-CCNC: 32 U/L (ref 7–45)
ANION GAP SERPL CALC-SCNC: 9 MMOL/L (ref 10–20)
AST SERPL W P-5'-P-CCNC: 34 U/L (ref 9–39)
BASOPHILS # BLD AUTO: 0.05 X10*3/UL (ref 0–0.1)
BASOPHILS NFR BLD AUTO: 0.5 %
BILIRUB SERPL-MCNC: 0.4 MG/DL (ref 0–1.2)
BUN SERPL-MCNC: 16 MG/DL (ref 6–23)
CALCIUM SERPL-MCNC: 9.6 MG/DL (ref 8.6–10.6)
CHLORIDE SERPL-SCNC: 99 MMOL/L (ref 98–107)
CO2 SERPL-SCNC: 34 MMOL/L (ref 21–32)
CREAT SERPL-MCNC: 0.99 MG/DL (ref 0.5–1.05)
EGFRCR SERPLBLD CKD-EPI 2021: 58 ML/MIN/1.73M*2
EOSINOPHIL # BLD AUTO: 0.06 X10*3/UL (ref 0–0.4)
EOSINOPHIL NFR BLD AUTO: 0.6 %
ERYTHROCYTE [DISTWIDTH] IN BLOOD BY AUTOMATED COUNT: 15.9 % (ref 11.5–14.5)
EST. AVERAGE GLUCOSE BLD GHB EST-MCNC: 123 MG/DL
GLUCOSE SERPL-MCNC: 73 MG/DL (ref 74–99)
HBA1C MFR BLD: 5.9 %
HCT VFR BLD AUTO: 41.2 % (ref 36–46)
HGB BLD-MCNC: 12.3 G/DL (ref 12–16)
IMM GRANULOCYTES # BLD AUTO: 0.02 X10*3/UL (ref 0–0.5)
IMM GRANULOCYTES NFR BLD AUTO: 0.2 % (ref 0–0.9)
IRON SATN MFR SERPL: ABNORMAL %
IRON SERPL-MCNC: 60 UG/DL (ref 35–150)
LYMPHOCYTES # BLD AUTO: 2.17 X10*3/UL (ref 0.8–3)
LYMPHOCYTES NFR BLD AUTO: 22.7 %
MCH RBC QN AUTO: 24.5 PG (ref 26–34)
MCHC RBC AUTO-ENTMCNC: 29.9 G/DL (ref 32–36)
MCV RBC AUTO: 82 FL (ref 80–100)
MONOCYTES # BLD AUTO: 0.55 X10*3/UL (ref 0.05–0.8)
MONOCYTES NFR BLD AUTO: 5.7 %
NEUTROPHILS # BLD AUTO: 6.72 X10*3/UL (ref 1.6–5.5)
NEUTROPHILS NFR BLD AUTO: 70.3 %
NRBC BLD-RTO: 0 /100 WBCS (ref 0–0)
PLATELET # BLD AUTO: 252 X10*3/UL (ref 150–450)
POTASSIUM SERPL-SCNC: 3.8 MMOL/L (ref 3.5–5.3)
PROT SERPL-MCNC: 7.3 G/DL (ref 6.4–8.2)
RBC # BLD AUTO: 5.03 X10*6/UL (ref 4–5.2)
SODIUM SERPL-SCNC: 138 MMOL/L (ref 136–145)
T4 FREE SERPL-MCNC: 1.15 NG/DL (ref 0.78–1.48)
TIBC SERPL-MCNC: ABNORMAL UG/DL
TSH SERPL-ACNC: 4.76 MIU/L (ref 0.44–3.98)
UIBC SERPL-MCNC: >450 UG/DL (ref 110–370)
WBC # BLD AUTO: 9.6 X10*3/UL (ref 4.4–11.3)

## 2024-10-02 PROCEDURE — 99214 OFFICE O/P EST MOD 30 MIN: CPT | Performed by: STUDENT IN AN ORGANIZED HEALTH CARE EDUCATION/TRAINING PROGRAM

## 2024-10-02 PROCEDURE — 1159F MED LIST DOCD IN RCRD: CPT | Performed by: STUDENT IN AN ORGANIZED HEALTH CARE EDUCATION/TRAINING PROGRAM

## 2024-10-02 PROCEDURE — 1160F RVW MEDS BY RX/DR IN RCRD: CPT | Performed by: STUDENT IN AN ORGANIZED HEALTH CARE EDUCATION/TRAINING PROGRAM

## 2024-10-02 PROCEDURE — 3078F DIAST BP <80 MM HG: CPT | Performed by: STUDENT IN AN ORGANIZED HEALTH CARE EDUCATION/TRAINING PROGRAM

## 2024-10-02 PROCEDURE — G0008 ADMIN INFLUENZA VIRUS VAC: HCPCS | Performed by: STUDENT IN AN ORGANIZED HEALTH CARE EDUCATION/TRAINING PROGRAM

## 2024-10-02 PROCEDURE — 90662 IIV NO PRSV INCREASED AG IM: CPT | Performed by: STUDENT IN AN ORGANIZED HEALTH CARE EDUCATION/TRAINING PROGRAM

## 2024-10-02 PROCEDURE — 3077F SYST BP >= 140 MM HG: CPT | Performed by: STUDENT IN AN ORGANIZED HEALTH CARE EDUCATION/TRAINING PROGRAM

## 2024-10-02 RX ORDER — OMEPRAZOLE 20 MG/1
CAPSULE, DELAYED RELEASE ORAL
Qty: 90 CAPSULE | Refills: 1 | Status: SHIPPED | OUTPATIENT
Start: 2024-10-02

## 2024-10-02 RX ORDER — TRIAMTERENE/HYDROCHLOROTHIAZID 37.5-25 MG
1 TABLET ORAL DAILY
Qty: 90 TABLET | Refills: 3 | Status: SHIPPED | OUTPATIENT
Start: 2024-10-02

## 2024-10-02 RX ORDER — TOPIRAMATE 25 MG/1
25 TABLET ORAL 2 TIMES DAILY
Qty: 60 TABLET | Refills: 5 | Status: SHIPPED | OUTPATIENT
Start: 2024-10-02 | End: 2025-03-31

## 2024-10-02 ASSESSMENT — PATIENT HEALTH QUESTIONNAIRE - PHQ9
1. LITTLE INTEREST OR PLEASURE IN DOING THINGS: NOT AT ALL
SUM OF ALL RESPONSES TO PHQ9 QUESTIONS 1 AND 2: 0
2. FEELING DOWN, DEPRESSED OR HOPELESS: NOT AT ALL

## 2024-10-02 NOTE — PROGRESS NOTES
Subjective   Patient ID: Alyssa Pruett is a 78 y.o. female who presents for Follow-up (Follow up visit /Skin has been very itchy, and bruising easily after itching. Wonders if she is allergic to her cat- her daughter also has 3 dogs ( just moved in with her daughter) but she tends to stay in her room ).  Moved in with her daughter, this has been an adjustment but overall an improvement.     Frustrated with her weight. Wants to lose weight. Not able to exercise due to her breathing. Interested in trying topamax.     Easy bruising. Has not been taking supplemental iron.     Reports has been having more itching. A couple lesions on her arms. Her daughter has 3 dogs. She is not sure if this is aggravating allergies.     Would like flu shot today.     No other concerns today.         Review of Systems   Constitutional:  Positive for fatigue.   HENT: Negative.     Respiratory:  Positive for shortness of breath.    Gastrointestinal: Negative.    Skin:  Positive for rash.   Neurological: Negative.    Hematological:  Bruises/bleeds easily.   All other systems reviewed and are negative.      Objective   Physical Exam  Vitals reviewed.   Constitutional:       Appearance: Normal appearance.   HENT:      Head: Normocephalic.   Eyes:      Pupils: Pupils are equal, round, and reactive to light.   Skin:     Findings: Lesion (left upper extremity and right wrist with pale pink scaly lesions) present.   Neurological:      General: No focal deficit present.      Mental Status: She is alert. Mental status is at baseline.   Psychiatric:         Mood and Affect: Mood normal.         Behavior: Behavior normal.         Body mass index is 36.49 kg/m².      Current Outpatient Medications:     albuterol 2.5 mg /3 mL (0.083 %) nebulizer solution, Inhale., Disp: , Rfl:     ALBUTEROL SULFATE INHL, Inhale 90 mcg., Disp: , Rfl:     ascorbic acid/bioflavonoids (TAYLOR-C PO), Take 1 tablet by mouth once daily., Disp: , Rfl:      budesonide-glycopyr-formoterol (Breztri Aerosphere) 160-9-4.8 mcg/actuation HFA aerosol inhaler, , Disp: , Rfl:     cholecalciferol (Vitamin D-3) 50 mcg (2,000 unit) capsule, Take by mouth., Disp: , Rfl:     clobetasol (Temovate) 0.05 % ointment, Apply topically 2 times a day., Disp: 15 g, Rfl: 2    cyanocobalamin (Vitamin B-12) 1,000 mcg tablet, Take 1 tablet (1,000 mcg) by mouth once daily., Disp: , Rfl:     sertraline (Zoloft) 50 mg tablet, TAKE 1 TABLET EVERY DAY, Disp: 90 tablet, Rfl: 1    simvastatin (Zocor) 40 mg tablet, TAKE 1 TABLET (40 MG) BY MOUTH ONCE DAILY., Disp: 90 tablet, Rfl: 1    ubidecarenone (coenzyme Q10) 100 mg tablet, Take by mouth., Disp: , Rfl:     omeprazole (PriLOSEC) 20 mg DR capsule, TAKE 1 CAPSULE EVERY DAY EARLY IN THE MORNING, Disp: 90 capsule, Rfl: 1    topiramate (Topamax) 25 mg tablet, Take 1 tablet (25 mg) by mouth 2 times a day., Disp: 60 tablet, Rfl: 5    triamterene-hydrochlorothiazid (Maxzide-25) 37.5-25 mg tablet, Take 1 tablet by mouth once daily., Disp: 90 tablet, Rfl: 3    Assessment/Plan   Diagnoses and all orders for this visit:  Dry skin dermatitis  -     Referral to Dermatology  Hypertension, unspecified type  -     triamterene-hydrochlorothiazid (Maxzide-25) 37.5-25 mg tablet; Take 1 tablet by mouth once daily.  Gastroesophageal reflux disease without esophagitis  -     omeprazole (PriLOSEC) 20 mg DR capsule; TAKE 1 CAPSULE EVERY DAY EARLY IN THE MORNING  Skin lesions  -     Referral to Dermatology  Short of breath on exertion  Class 2 severe obesity with serious comorbidity and body mass index (BMI) of 36.0 to 36.9 in adult, unspecified obesity type  -     topiramate (Topamax) 25 mg tablet; Take 1 tablet (25 mg) by mouth 2 times a day.  Encounter for immunization  -     Flu vaccine, trivalent, preservative free, HIGH-DOSE, age 65y+ (Fluzone)  Eczema, unspecified type  Hyperglycemia  -     Hemoglobin A1c  -     Comprehensive metabolic panel  Chronic obstructive  pulmonary disease, unspecified COPD type (Multi)  Thyroid disorder screening  -     TSH with reflex to Free T4 if abnormal  -     Thyroxine, Free  Low iron  -     CBC and Auto Differential  -     Iron and TIBC  Pruritus  Allergy, sequela  -     Respiratory Allergy Profile IgE  Seasonal allergies  -     Respiratory Allergy Profile IgE  Major depressive disorder, single episode, in full remission (CMS-HCC)  Atherosclerosis of aorta (CMS-HCC)       Follow up in 3 months    Farnaz Helm DO 10/04/24 10:30 PM

## 2024-10-03 LAB

## 2024-10-04 PROBLEM — F32.5 MAJOR DEPRESSIVE DISORDER, SINGLE EPISODE, IN FULL REMISSION (CMS-HCC): Status: ACTIVE | Noted: 2024-10-04

## 2024-10-04 ASSESSMENT — ENCOUNTER SYMPTOMS
NEUROLOGICAL NEGATIVE: 1
SHORTNESS OF BREATH: 1
BRUISES/BLEEDS EASILY: 1
GASTROINTESTINAL NEGATIVE: 1
FATIGUE: 1

## 2024-10-09 PROBLEM — E66.812 CLASS 2 OBESITY WITH BODY MASS INDEX (BMI) OF 36.0 TO 36.9 IN ADULT: Status: ACTIVE | Noted: 2024-10-09

## 2024-10-09 PROBLEM — I72.0 CAROTID ANEURYSM, RIGHT (CMS-HCC): Chronic | Status: RESOLVED | Noted: 2023-12-21 | Resolved: 2024-10-09

## 2024-10-25 ENCOUNTER — APPOINTMENT (OUTPATIENT)
Dept: DERMATOLOGY | Facility: CLINIC | Age: 79
End: 2024-10-25
Payer: MEDICARE

## 2024-10-30 PROBLEM — R73.9 HYPERGLYCEMIA: Status: RESOLVED | Noted: 2023-12-21 | Resolved: 2024-10-30

## 2024-10-30 PROBLEM — I67.1 CEREBRAL ANEURYSM (HHS-HCC): Chronic | Status: ACTIVE | Noted: 2023-12-21

## 2024-10-30 PROBLEM — M79.604 PAIN OF RIGHT LOWER EXTREMITY: Status: RESOLVED | Noted: 2023-12-21 | Resolved: 2024-10-30

## 2024-10-30 PROBLEM — R13.10 DYSPHAGIA: Status: RESOLVED | Noted: 2024-06-17 | Resolved: 2024-10-30

## 2024-10-30 PROBLEM — F32.A DEPRESSION: Status: RESOLVED | Noted: 2023-12-21 | Resolved: 2024-10-30

## 2024-10-30 PROBLEM — R09.89 PULMONARY CONGESTION: Status: RESOLVED | Noted: 2024-06-17 | Resolved: 2024-10-30

## 2024-10-30 PROBLEM — M19.011 PRIMARY OSTEOARTHRITIS, RIGHT SHOULDER: Status: RESOLVED | Noted: 2023-04-18 | Resolved: 2024-10-30

## 2024-10-31 ENCOUNTER — APPOINTMENT (OUTPATIENT)
Dept: CARDIOLOGY | Facility: CLINIC | Age: 79
End: 2024-10-31
Payer: MEDICARE

## 2024-10-31 VITALS — SYSTOLIC BLOOD PRESSURE: 136 MMHG | HEART RATE: 102 BPM | DIASTOLIC BLOOD PRESSURE: 84 MMHG | OXYGEN SATURATION: 95 %

## 2024-10-31 DIAGNOSIS — E78.00 PURE HYPERCHOLESTEROLEMIA: Chronic | ICD-10-CM

## 2024-10-31 DIAGNOSIS — R60.0 EDEMA OF BOTH LEGS: Chronic | ICD-10-CM

## 2024-10-31 DIAGNOSIS — I10 PRIMARY HYPERTENSION: Primary | Chronic | ICD-10-CM

## 2024-10-31 DIAGNOSIS — R06.02 SHORTNESS OF BREATH: ICD-10-CM

## 2024-10-31 PROCEDURE — 93005 ELECTROCARDIOGRAM TRACING: CPT | Performed by: INTERNAL MEDICINE

## 2024-10-31 PROCEDURE — 99213 OFFICE O/P EST LOW 20 MIN: CPT | Performed by: INTERNAL MEDICINE

## 2024-10-31 PROCEDURE — 3075F SYST BP GE 130 - 139MM HG: CPT | Performed by: INTERNAL MEDICINE

## 2024-10-31 PROCEDURE — 1036F TOBACCO NON-USER: CPT | Performed by: INTERNAL MEDICINE

## 2024-10-31 PROCEDURE — 3079F DIAST BP 80-89 MM HG: CPT | Performed by: INTERNAL MEDICINE

## 2024-10-31 PROCEDURE — 1159F MED LIST DOCD IN RCRD: CPT | Performed by: INTERNAL MEDICINE

## 2024-10-31 PROCEDURE — 1160F RVW MEDS BY RX/DR IN RCRD: CPT | Performed by: INTERNAL MEDICINE

## 2024-12-02 ENCOUNTER — APPOINTMENT (OUTPATIENT)
Dept: PRIMARY CARE | Facility: CLINIC | Age: 79
End: 2024-12-02
Payer: MEDICARE

## 2024-12-02 VITALS
WEIGHT: 201.5 LBS | OXYGEN SATURATION: 94 % | HEART RATE: 88 BPM | SYSTOLIC BLOOD PRESSURE: 134 MMHG | BODY MASS INDEX: 38.04 KG/M2 | HEIGHT: 61 IN | DIASTOLIC BLOOD PRESSURE: 78 MMHG

## 2024-12-02 DIAGNOSIS — E61.1 LOW IRON: Chronic | ICD-10-CM

## 2024-12-02 DIAGNOSIS — K59.00 CONSTIPATION, UNSPECIFIED CONSTIPATION TYPE: ICD-10-CM

## 2024-12-02 DIAGNOSIS — R60.0 PEDAL EDEMA: ICD-10-CM

## 2024-12-02 DIAGNOSIS — E66.812 CLASS 2 SEVERE OBESITY WITH SERIOUS COMORBIDITY AND BODY MASS INDEX (BMI) OF 36.0 TO 36.9 IN ADULT, UNSPECIFIED OBESITY TYPE: Primary | Chronic | ICD-10-CM

## 2024-12-02 DIAGNOSIS — E66.01 CLASS 2 SEVERE OBESITY WITH SERIOUS COMORBIDITY AND BODY MASS INDEX (BMI) OF 36.0 TO 36.9 IN ADULT, UNSPECIFIED OBESITY TYPE: Primary | Chronic | ICD-10-CM

## 2024-12-02 LAB
IRON SATN MFR SERPL: 9 % (ref 25–45)
IRON SERPL-MCNC: 41 UG/DL (ref 35–150)
TIBC SERPL-MCNC: 464 UG/DL (ref 240–445)
UIBC SERPL-MCNC: 423 UG/DL (ref 110–370)

## 2024-12-02 PROCEDURE — 3078F DIAST BP <80 MM HG: CPT | Performed by: STUDENT IN AN ORGANIZED HEALTH CARE EDUCATION/TRAINING PROGRAM

## 2024-12-02 PROCEDURE — 83540 ASSAY OF IRON: CPT

## 2024-12-02 PROCEDURE — 1159F MED LIST DOCD IN RCRD: CPT | Performed by: STUDENT IN AN ORGANIZED HEALTH CARE EDUCATION/TRAINING PROGRAM

## 2024-12-02 PROCEDURE — 1036F TOBACCO NON-USER: CPT | Performed by: STUDENT IN AN ORGANIZED HEALTH CARE EDUCATION/TRAINING PROGRAM

## 2024-12-02 PROCEDURE — 83550 IRON BINDING TEST: CPT

## 2024-12-02 PROCEDURE — 3075F SYST BP GE 130 - 139MM HG: CPT | Performed by: STUDENT IN AN ORGANIZED HEALTH CARE EDUCATION/TRAINING PROGRAM

## 2024-12-02 PROCEDURE — 99214 OFFICE O/P EST MOD 30 MIN: CPT | Performed by: STUDENT IN AN ORGANIZED HEALTH CARE EDUCATION/TRAINING PROGRAM

## 2024-12-02 RX ORDER — TOPIRAMATE 50 MG/1
50 TABLET, FILM COATED ORAL 2 TIMES DAILY
Qty: 180 TABLET | Refills: 1 | Status: SHIPPED | OUTPATIENT
Start: 2024-12-02 | End: 2025-05-31

## 2024-12-02 ASSESSMENT — ENCOUNTER SYMPTOMS
CONSTIPATION: 1
CONSTITUTIONAL NEGATIVE: 1
LOSS OF SENSATION IN FEET: 0
MUSCULOSKELETAL NEGATIVE: 1
DEPRESSION: 0
PSYCHIATRIC NEGATIVE: 1
NEUROLOGICAL NEGATIVE: 1
CARDIOVASCULAR NEGATIVE: 1
OCCASIONAL FEELINGS OF UNSTEADINESS: 0
RESPIRATORY NEGATIVE: 1

## 2024-12-02 NOTE — PROGRESS NOTES
Subjective   Patient ID: Alyssa Pruett is a 79 y.o. female who presents for No chief complaint on file..    HPI  Review of Systems  Physical Exam    Objective     No diagnosis found.   Patient Active Problem List   Diagnosis    Anxiety    Ataxia    Rdz's esophagus    Cerebral aneurysm ([facility])    COPD (chronic obstructive pulmonary disease) (Multi)    Edema of both legs    Fatigue    HTN (hypertension)    Polyneuropathy    Pure hypercholesterolemia    Shortness of breath    Vitamin D deficiency    Solitary pulmonary nodule    Impairment of balance    Dizziness    Acute bronchitis    Atherosclerosis of aorta (CMS-[facility])    Obstructive sleep apnea    Major depressive disorder, single episode, in full remission (CMS-HCC)    Class 2 obesity with body mass index (BMI) of 36.0 to 36.9 in adult      Allergies   Allergen Reactions    Bupropion Other      Medication Documentation Review Audit       Reviewed by Nayely Merrill MD (Physician) on 10/31/24 at 1344      Medication Order Taking? Sig Documenting Provider Last Dose Status   albuterol 2.5 mg /3 mL (0.083 %) nebulizer solution [id number] Yes Inhale. Historical Provider, MD Taking Active   ALBUTEROL SULFATE INHL [id number] Yes Inhale 90 mcg. Historical Provider, MD Taking Active   ascorbic acid/bioflavonoids (TAYLOR-C PO) [id number] Yes Take 1 tablet by mouth once daily. Historical Provider, MD Taking Active   budesonide-glycopyr-formoterol (Breztri Aerosphere) 160-9-4.8 mcg/actuation HFA aerosol inhaler [id number] Yes  Historical Provider, MD Taking Active   cholecalciferol (Vitamin D-3) 50 mcg (2,000 unit) capsule [id number] Yes Take by mouth. Historical Provider, MD Taking Active   clobetasol (Temovate) 0.05 % ointment [id number] Yes Apply topically 2 times a day. Farnaz Helm,  Taking Active   cyanocobalamin (Vitamin B-12) 1,000 mcg tablet [id number] Yes Take 1 tablet (1,000 mcg) by mouth once daily. Historical Provider, MD Taking Active   omeprazole (PriLOSEC) 20 mg   capsule [id number] Yes TAKE 1 CAPSULE EVERY DAY EARLY IN THE MORNING Farnaz Helm, DO  Active   sertraline (Zoloft) 50 mg tablet [id number] Yes TAKE 1 TABLET EVERY DAY Willa Worthy, APRN-CNP Taking Active   simvastatin (Zocor) 40 mg tablet [id number] No TAKE 1 TABLET (40 MG) BY MOUTH ONCE DAILY. Willa Worthy, APRN-CNP Taking  10/02/24 2359   topiramate (Topamax) 25 mg tablet [id number] Yes Take 1 tablet (25 mg) by mouth 2 times a day. Farnaz Helm, DO  Active   triamterene-hydrochlorothiazid (Maxzide-25) 37.5-25 mg tablet [id number] Yes Take 1 tablet by mouth once daily. Farnaz Helm, DO  Active   ubidecarenone (coenzyme Q10) 100 mg tablet [id number] Yes Take by mouth. Historical Provider, MD Taking Active                    Past Medical History:   Diagnosis Date    Candidal stomatitis     Thrush    Carotid aneurysm, right (CMS-HCC) 2023    Cerebral aneurysm ([facility]-HCC) 2023    Chronic obstructive pulmonary disease with (acute) exacerbation (Multi) 10/22/2019    COPD exacerbation    Chronic obstructive pulmonary disease with (acute) exacerbation (Multi) 2014    COPD with exacerbation    COPD (chronic obstructive pulmonary disease) (Multi) 2023    On 3L O2    Dysphagia, unspecified     Dysphagia    Edema of both legs 2023    Encounter for other screening for malignant neoplasm of breast 2017    Encounter for screening breast examination    HTN (hypertension) 2023    Impacted cerumen, bilateral 2018    Impacted cerumen of both ears    Impacted cerumen, right ear 2017    Impacted cerumen of right ear    Low back pain, unspecified 2018    Acute right-sided low back pain without sciatica    Low back pain, unspecified 10/02/2018    Acute left-sided low back pain without sciatica    Nicotine dependence, unspecified, uncomplicated 2014    Smoker    Nicotine dependence, unspecified, uncomplicated 2017    Tobacco dependence     Nonscarring hair loss, unspecified 12/14/2018    Hair loss    Obstructive sleep apnea 06/17/2024    Other specified symptoms and signs involving the circulatory and respiratory systems 10/07/2019    Chest congestion    Pain in right knee 12/14/2018    Acute pain of right knee    Pain in unspecified shoulder 06/05/2015    Shoulder pain    Personal history of other diseases of the musculoskeletal system and connective tissue     History of low back pain    Personal history of other diseases of the respiratory system 10/07/2019    History of acute bronchitis    Personal history of other diseases of the respiratory system     History of bronchitis    Personal history of other diseases of the respiratory system 04/26/2018    History of paranasal sinus congestion    Personal history of other diseases of the respiratory system 01/30/2017    History of pharyngitis    Personal history of other diseases of the respiratory system 11/06/2014    History of chronic obstructive lung disease    Personal history of other drug therapy 10/06/2014    History of influenza vaccination    Personal history of other endocrine, nutritional and metabolic disease 10/28/2014    History of hypercholesterolemia    Personal history of other endocrine, nutritional and metabolic disease     History of high cholesterol    Personal history of other infectious and parasitic diseases 11/17/2018    History of herpes zoster    Personal history of other infectious and parasitic diseases 11/20/2018    History of herpes zoster    Personal history of other medical treatment 01/19/2017    History of screening mammography    Personal history of other medical treatment 02/03/2021    History of screening mammography    Personal history of other specified conditions 08/23/2018    History of dizziness    Personal history of other specified conditions 03/23/2015    History of abdominal pain    Personal history of other specified conditions 05/23/2018    History  of epistaxis    Personal history of other specified conditions     History of insomnia    Pure hypercholesterolemia 2023    PCP follows    Sudden idiopathic hearing loss, left ear 2017    Sudden hearing loss, left    Urinary tract infection, site not specified     UTI (lower urinary tract infection)     Social History     Tobacco Use   Smoking Status Former    Current packs/day: 0.00    Types: Cigarettes    Quit date:     Years since quittin.9   Smokeless Tobacco Never     No family history on file.   Past Surgical History:   Procedure Laterality Date    CATARACT EXTRACTION  2018    Cataract Extraction     SECTION, CLASSIC  2014     Section    COLONOSCOPY  06/10/2021    Complete Colonoscopy    FOOT SURGERY  2017    Foot Repair    KNEE SURGERY  2014    Knee Surgery    OTHER SURGICAL HISTORY  2014    Reported Hx Of Breast Surgery For Biopsy       Assessment/Plan         ROBER ESCALANTE MA

## 2024-12-02 NOTE — PROGRESS NOTES
Subjective   Patient ID: Alyssa Pruett is a 79 y.o. female who presents for Follow-up (LOW IRON LAST VISIT, TAKING IRON SUPPLEMENTS).  Taking her iron every other day. Is making her constipated. Used to be regular. Asks what she could try to help this.     Tolerating topamax 25mg BID. No side effects. Down 5 pounds from last visit.     Breathing is about the same.    Some ankle swelling. Could be related to sodium consumption with Thanksgiving.      No other concerns today.         Review of Systems   Constitutional: Negative.    Respiratory: Negative.     Cardiovascular: Negative.    Gastrointestinal:  Positive for constipation.   Musculoskeletal: Negative.    Neurological: Negative.    Psychiatric/Behavioral: Negative.     All other systems reviewed and are negative.      Objective   Physical Exam  Vitals reviewed.   Constitutional:       Appearance: Normal appearance.   HENT:      Head: Normocephalic.   Eyes:      Pupils: Pupils are equal, round, and reactive to light.   Cardiovascular:      Rate and Rhythm: Normal rate and regular rhythm.   Pulmonary:      Effort: Pulmonary effort is normal. No respiratory distress.      Breath sounds: Normal breath sounds. No wheezing or rhonchi.   Musculoskeletal:         General: Normal range of motion.   Neurological:      General: No focal deficit present.      Mental Status: She is alert. Mental status is at baseline.   Psychiatric:         Mood and Affect: Mood normal.         Behavior: Behavior normal.         Body mass index is 38.07 kg/m².      Current Outpatient Medications:     albuterol 2.5 mg /3 mL (0.083 %) nebulizer solution, Inhale., Disp: , Rfl:     ALBUTEROL SULFATE INHL, Inhale 90 mcg., Disp: , Rfl:     ascorbic acid/bioflavonoids (TAYLOR-C PO), Take 1 tablet by mouth once daily., Disp: , Rfl:     budesonide-glycopyr-formoterol (Breztri Aerosphere) 160-9-4.8 mcg/actuation HFA aerosol inhaler, , Disp: , Rfl:     cholecalciferol (Vitamin D-3) 50 mcg (2,000 unit)  capsule, Take by mouth., Disp: , Rfl:     clobetasol (Temovate) 0.05 % ointment, Apply topically 2 times a day., Disp: 15 g, Rfl: 2    cyanocobalamin (Vitamin B-12) 1,000 mcg tablet, Take 1 tablet (1,000 mcg) by mouth once daily., Disp: , Rfl:     omeprazole (PriLOSEC) 20 mg DR capsule, TAKE 1 CAPSULE EVERY DAY EARLY IN THE MORNING, Disp: 90 capsule, Rfl: 1    sertraline (Zoloft) 50 mg tablet, TAKE 1 TABLET EVERY DAY, Disp: 90 tablet, Rfl: 1    simvastatin (Zocor) 40 mg tablet, TAKE 1 TABLET (40 MG) BY MOUTH ONCE DAILY., Disp: 90 tablet, Rfl: 1    topiramate (Topamax) 50 mg tablet, Take 1 tablet (50 mg) by mouth 2 times a day., Disp: 180 tablet, Rfl: 1    triamterene-hydrochlorothiazid (Maxzide-25) 37.5-25 mg tablet, Take 1 tablet by mouth once daily., Disp: 90 tablet, Rfl: 3    ubidecarenone (coenzyme Q10) 100 mg tablet, Take by mouth., Disp: , Rfl:     Assessment/Plan   Diagnoses and all orders for this visit:  Class 2 severe obesity with serious comorbidity and body mass index (BMI) of 36.0 to 36.9 in adult, unspecified obesity type  Comments:  tolerating topamax  down 5 pounds  increase topamax to 50mg BID  Orders:  -     topiramate (Topamax) 50 mg tablet; Take 1 tablet (50 mg) by mouth 2 times a day.  Low iron  Comments:  decrease iron to once weekly due to constipation  check levels  Orders:  -     Iron and TIBC  Constipation, unspecified constipation type  Comments:  decrease iron to weekly  add miralax  Pedal edema  Comments:  likely related to sodium consumption       Follow up in 3 months    Farnaz Helm DO 12/02/24 6:31 PM

## 2025-02-18 DIAGNOSIS — E78.5 HYPERLIPIDEMIA, UNSPECIFIED HYPERLIPIDEMIA TYPE: ICD-10-CM

## 2025-02-18 DIAGNOSIS — K21.9 GASTROESOPHAGEAL REFLUX DISEASE WITHOUT ESOPHAGITIS: ICD-10-CM

## 2025-02-18 RX ORDER — SIMVASTATIN 40 MG/1
40 TABLET, FILM COATED ORAL DAILY
Qty: 90 TABLET | Refills: 3 | Status: SHIPPED | OUTPATIENT
Start: 2025-02-18

## 2025-02-18 RX ORDER — OMEPRAZOLE 20 MG/1
CAPSULE, DELAYED RELEASE ORAL
Qty: 90 CAPSULE | Refills: 3 | Status: SHIPPED | OUTPATIENT
Start: 2025-02-18

## 2025-03-24 ASSESSMENT — DERMATOLOGY QUALITY OF LIFE (QOL) ASSESSMENT
RATE HOW EMOTIONALLY BOTHERED YOU ARE BY YOUR SKIN PROBLEM (FOR EXAMPLE, WORRY, EMBARRASSMENT, FRUSTRATION): 5
WHAT SINGLE SKIN CONDITION LISTED BELOW IS THE PATIENT ANSWERING THE QUALITY-OF-LIFE ASSESSMENT QUESTIONS ABOUT: ACTINIC KERATOSIS
RATE HOW EMOTIONALLY BOTHERED YOU ARE BY YOUR SKIN PROBLEM (FOR EXAMPLE, WORRY, EMBARRASSMENT, FRUSTRATION): 5
WHAT SINGLE SKIN CONDITION LISTED BELOW IS THE PATIENT ANSWERING THE QUALITY-OF-LIFE ASSESSMENT QUESTIONS ABOUT: ACTINIC KERATOSIS
RATE HOW BOTHERED YOU ARE BY SYMPTOMS OF YOUR SKIN PROBLEM (EG, ITCHING, STINGING BURNING, HURTING OR SKIN IRRITATION): 6 - ALWAYS BOTHERED
RATE HOW BOTHERED YOU ARE BY EFFECTS OF YOUR SKIN PROBLEMS ON YOUR ACTIVITIES (EG, GOING OUT, ACCOMPLISHING WHAT YOU WANT, WORK ACTIVITIES OR YOUR RELATIONSHIPS WITH OTHERS): 4
RATE HOW BOTHERED YOU ARE BY EFFECTS OF YOUR SKIN PROBLEMS ON YOUR ACTIVITIES (EG, GOING OUT, ACCOMPLISHING WHAT YOU WANT, WORK ACTIVITIES OR YOUR RELATIONSHIPS WITH OTHERS): 4
DATE THE QUALITY-OF-LIFE ASSESSMENT WAS COMPLETED: 67288
RATE HOW BOTHERED YOU ARE BY SYMPTOMS OF YOUR SKIN PROBLEM (EG, ITCHING, STINGING BURNING, HURTING OR SKIN IRRITATION): 6 - ALWAYS BOTHERED

## 2025-03-24 ASSESSMENT — PATIENT GLOBAL ASSESSMENT (PGA): WHAT IS THE PGA: PATIENT GLOBAL ASSESSMENT:  2 - MILD

## 2025-03-31 ENCOUNTER — APPOINTMENT (OUTPATIENT)
Dept: DERMATOLOGY | Facility: CLINIC | Age: 80
End: 2025-03-31
Payer: MEDICARE

## 2025-03-31 DIAGNOSIS — L98.8 RHYTIDES: ICD-10-CM

## 2025-03-31 DIAGNOSIS — L85.3 XEROSIS CUTIS: ICD-10-CM

## 2025-03-31 DIAGNOSIS — L57.0 ACTINIC KERATOSIS: Primary | ICD-10-CM

## 2025-03-31 PROCEDURE — 1036F TOBACCO NON-USER: CPT | Performed by: NURSE PRACTITIONER

## 2025-03-31 PROCEDURE — 1159F MED LIST DOCD IN RCRD: CPT | Performed by: NURSE PRACTITIONER

## 2025-03-31 PROCEDURE — 99203 OFFICE O/P NEW LOW 30 MIN: CPT | Performed by: NURSE PRACTITIONER

## 2025-03-31 RX ORDER — TRETINOIN 0.5 MG/G
CREAM TOPICAL
Qty: 20 G | Refills: 1 | Status: SHIPPED | OUTPATIENT
Start: 2025-03-31

## 2025-03-31 NOTE — PROGRESS NOTES
Subjective     Alyssa Pruett is a 79 y.o. female who presents for the following: Rash and multiple lesions.   New patient in for dry itchy skin all over, including face.  Patient states she has tried lots of over-the-counter lotions without relief.  Patient states that she has low iron and has started a iron supplement every other day which she found to be helpful for the itch.    Patient would also like to talk about milia on the face and rough patches on the face.  Patient states that she has scaliness to her left upper arm as well.    Review of Systems:  No other skin or systemic complaints other than what is documented elsewhere in the note.    The following portions of the chart were reviewed this encounter and updated as appropriate:       Skin Cancer History  No skin cancer on file.    Specialty Problems    None    Past Medical History:  Alyssa Pruett  has a past medical history of Candidal stomatitis, Carotid aneurysm, right (CMS-HCC) (12/21/2023), Cerebral aneurysm ([facility]-HCC) (12/21/2023), Chronic obstructive pulmonary disease with (acute) exacerbation (Multi) (10/22/2019), Chronic obstructive pulmonary disease with (acute) exacerbation (Multi) (11/06/2014), COPD (chronic obstructive pulmonary disease) (Multi) (12/21/2023), Dysphagia, unspecified, Edema of both legs (12/21/2023), Encounter for other screening for malignant neoplasm of breast (01/19/2017), HTN (hypertension) (12/21/2023), Impacted cerumen, bilateral (08/23/2018), Impacted cerumen, right ear (01/30/2017), Low back pain, unspecified (11/02/2018), Low back pain, unspecified (10/02/2018), Nicotine dependence, unspecified, uncomplicated (11/06/2014), Nicotine dependence, unspecified, uncomplicated (11/30/2017), Nonscarring hair loss, unspecified (12/14/2018), Obstructive sleep apnea (06/17/2024), Other specified symptoms and signs involving the circulatory and respiratory systems (10/07/2019), Pain in right knee (12/14/2018), Pain in unspecified  shoulder (2015), Personal history of other diseases of the musculoskeletal system and connective tissue, Personal history of other diseases of the respiratory system (10/07/2019), Personal history of other diseases of the respiratory system, Personal history of other diseases of the respiratory system (2018), Personal history of other diseases of the respiratory system (2017), Personal history of other diseases of the respiratory system (2014), Personal history of other drug therapy (10/06/2014), Personal history of other endocrine, nutritional and metabolic disease (10/28/2014), Personal history of other endocrine, nutritional and metabolic disease, Personal history of other infectious and parasitic diseases (2018), Personal history of other infectious and parasitic diseases (2018), Personal history of other medical treatment (2017), Personal history of other medical treatment (2021), Personal history of other specified conditions (2018), Personal history of other specified conditions (2015), Personal history of other specified conditions (2018), Personal history of other specified conditions, Pure hypercholesterolemia (2023), Sudden idiopathic hearing loss, left ear (2017), and Urinary tract infection, site not specified.    Past Surgical History:  Alyssa Pruett  has a past surgical history that includes Foot surgery (2017); Colonoscopy (06/10/2021); Cataract extraction (2018); Other surgical history (2014);  section, classic (2014); and Knee surgery (2014).    Family History:  Patient family history is not on file.    Social History:  Alyssa Pruett  reports that she quit smoking about 8 years ago. Her smoking use included cigarettes. She has never used smokeless tobacco. No history on file for alcohol use and drug use.    Allergies:  Bupropion    Current Medications / CAM's:    Current Outpatient  Medications:     albuterol 2.5 mg /3 mL (0.083 %) nebulizer solution, Inhale., Disp: , Rfl:     ALBUTEROL SULFATE INHL, Inhale 90 mcg., Disp: , Rfl:     ascorbic acid/bioflavonoids (TAYLOR-C PO), Take 1 tablet by mouth once daily., Disp: , Rfl:     budesonide-glycopyr-formoterol (Breztri Aerosphere) 160-9-4.8 mcg/actuation HFA aerosol inhaler, , Disp: , Rfl:     cholecalciferol (Vitamin D-3) 50 mcg (2,000 unit) capsule, Take by mouth., Disp: , Rfl:     clobetasol (Temovate) 0.05 % ointment, Apply topically 2 times a day., Disp: 15 g, Rfl: 2    cyanocobalamin (Vitamin B-12) 1,000 mcg tablet, Take 1 tablet (1,000 mcg) by mouth once daily., Disp: , Rfl:     omeprazole (PriLOSEC) 20 mg DR capsule, TAKE 1 CAPSULE EVERY DAY EARLY IN THE MORNING, Disp: 90 capsule, Rfl: 3    sertraline (Zoloft) 50 mg tablet, TAKE 1 TABLET EVERY DAY, Disp: 90 tablet, Rfl: 1    simvastatin (Zocor) 40 mg tablet, TAKE 1 TABLET EVERY DAY, Disp: 90 tablet, Rfl: 3    topiramate (Topamax) 50 mg tablet, Take 1 tablet (50 mg) by mouth 2 times a day., Disp: 180 tablet, Rfl: 1    triamterene-hydrochlorothiazid (Maxzide-25) 37.5-25 mg tablet, Take 1 tablet by mouth once daily., Disp: 90 tablet, Rfl: 3    ubidecarenone (coenzyme Q10) 100 mg tablet, Take by mouth., Disp: , Rfl:      Objective   Well appearing patient in no apparent distress; mood and affect are within normal limits.      Assessment/Plan   1. Actinic keratosis (2)  Left Malar Cheek, Right Malar Cheek  Erythematous scaly macule(s)    -Discussed nature of diagnosis and treatment options.   -Patient wishes to proceed with Cryotherapy today  -Possible side effects of liquid nitrogen treatment reviewed including formation of blisters, crusting, tenderness, scar, and discoloration which may be permanent.  -Patient advised to return the office for re-evaluation if the treated lesion(s) do not resolve within 4-6 weeks. Patient verbalizes understanding.    Destr of lesion - Left Malar Cheek, Right  Malar Cheek  Complexity: simple    Destruction method: cryotherapy    Informed consent: discussed and consent obtained    Lesion destroyed using liquid nitrogen: Yes    Region frozen until ice ball extended beyond lesion: Yes    Cryotherapy cycles:  1  Outcome: patient tolerated procedure well with no complications    Post-procedure details: wound care instructions given      2. Xerosis cutis  Dry skin    -Discussed nature of condition  -Discussed gentle skin care habits including using gentle soap such as Dove or Cetaphil to cleanse the skin.   -Recommend to avoid harsh cleansers/soaps such as: Dial, Lever 2000, [place]ra [place].  -Recommend to use emollients twice daily, once immediately after the shower while the skin is still damp.   -Recommended emollients include: Aveeno Eczema Therapy or Daily Moisturizer, La Roche Posay Lipikar AP Balm, or plain Vaseline.   -Recommend to avoid hot water/showers; lukewarm or cool water/showers will be beneficial.         3. Rhytides  Head - Anterior (Face)  Dynamic and/or static rhytids, lentigines, changes associated with chronic sun exposure

## 2025-04-01 DIAGNOSIS — E66.812 CLASS 2 SEVERE OBESITY WITH SERIOUS COMORBIDITY AND BODY MASS INDEX (BMI) OF 36.0 TO 36.9 IN ADULT, UNSPECIFIED OBESITY TYPE: Chronic | ICD-10-CM

## 2025-04-01 DIAGNOSIS — E66.01 CLASS 2 SEVERE OBESITY WITH SERIOUS COMORBIDITY AND BODY MASS INDEX (BMI) OF 36.0 TO 36.9 IN ADULT, UNSPECIFIED OBESITY TYPE: Chronic | ICD-10-CM

## 2025-04-01 RX ORDER — TOPIRAMATE 50 MG/1
50 TABLET, FILM COATED ORAL 2 TIMES DAILY
Qty: 180 TABLET | Refills: 3 | Status: SHIPPED | OUTPATIENT
Start: 2025-04-01

## 2025-05-29 ASSESSMENT — PROMIS GLOBAL HEALTH SCALE
RATE_MENTAL_HEALTH: GOOD
EMOTIONAL_PROBLEMS: SOMETIMES
RATE_SOCIAL_SATISFACTION: GOOD
CARRYOUT_SOCIAL_ACTIVITIES: GOOD
RATE_AVERAGE_FATIGUE: MILD
RATE_PHYSICAL_HEALTH: GOOD
CARRYOUT_PHYSICAL_ACTIVITIES: MODERATELY
RATE_GENERAL_HEALTH: GOOD
RATE_AVERAGE_PAIN: 5
RATE_QUALITY_OF_LIFE: GOOD

## 2025-06-03 ENCOUNTER — APPOINTMENT (OUTPATIENT)
Dept: PRIMARY CARE | Facility: CLINIC | Age: 80
End: 2025-06-03
Payer: MEDICARE

## 2025-06-03 VITALS
HEART RATE: 90 BPM | SYSTOLIC BLOOD PRESSURE: 138 MMHG | BODY MASS INDEX: 37 KG/M2 | WEIGHT: 196 LBS | OXYGEN SATURATION: 94 % | DIASTOLIC BLOOD PRESSURE: 78 MMHG | HEIGHT: 61 IN

## 2025-06-03 DIAGNOSIS — Z13.820 OSTEOPOROSIS SCREENING: ICD-10-CM

## 2025-06-03 DIAGNOSIS — J44.9 CHRONIC OBSTRUCTIVE PULMONARY DISEASE, UNSPECIFIED COPD TYPE (MULTI): ICD-10-CM

## 2025-06-03 DIAGNOSIS — R73.9 HYPERGLYCEMIA: ICD-10-CM

## 2025-06-03 DIAGNOSIS — Z00.00 MEDICARE ANNUAL WELLNESS VISIT, SUBSEQUENT: Primary | ICD-10-CM

## 2025-06-03 DIAGNOSIS — E66.812 CLASS 2 SEVERE OBESITY WITH SERIOUS COMORBIDITY AND BODY MASS INDEX (BMI) OF 37.0 TO 37.9 IN ADULT, UNSPECIFIED OBESITY TYPE: ICD-10-CM

## 2025-06-03 DIAGNOSIS — Z13.820 ENCOUNTER FOR OSTEOPOROSIS SCREENING IN ASYMPTOMATIC POSTMENOPAUSAL PATIENT: ICD-10-CM

## 2025-06-03 DIAGNOSIS — R94.6 ABNORMAL THYROID FUNCTION TEST: ICD-10-CM

## 2025-06-03 DIAGNOSIS — R79.89 ABNORMAL THYROID BLOOD TEST: ICD-10-CM

## 2025-06-03 DIAGNOSIS — Z78.0 ENCOUNTER FOR OSTEOPOROSIS SCREENING IN ASYMPTOMATIC POSTMENOPAUSAL PATIENT: ICD-10-CM

## 2025-06-03 DIAGNOSIS — Z12.31 BREAST CANCER SCREENING BY MAMMOGRAM: ICD-10-CM

## 2025-06-03 DIAGNOSIS — N18.31 CHRONIC KIDNEY DISEASE, STAGE 3A (MULTI): ICD-10-CM

## 2025-06-03 DIAGNOSIS — E66.01 CLASS 2 SEVERE OBESITY WITH SERIOUS COMORBIDITY AND BODY MASS INDEX (BMI) OF 37.0 TO 37.9 IN ADULT, UNSPECIFIED OBESITY TYPE: ICD-10-CM

## 2025-06-03 DIAGNOSIS — E61.1 LOW IRON: ICD-10-CM

## 2025-06-03 PROCEDURE — 1160F RVW MEDS BY RX/DR IN RCRD: CPT | Performed by: STUDENT IN AN ORGANIZED HEALTH CARE EDUCATION/TRAINING PROGRAM

## 2025-06-03 PROCEDURE — 3075F SYST BP GE 130 - 139MM HG: CPT | Performed by: STUDENT IN AN ORGANIZED HEALTH CARE EDUCATION/TRAINING PROGRAM

## 2025-06-03 PROCEDURE — 99214 OFFICE O/P EST MOD 30 MIN: CPT | Performed by: STUDENT IN AN ORGANIZED HEALTH CARE EDUCATION/TRAINING PROGRAM

## 2025-06-03 PROCEDURE — G0439 PPPS, SUBSEQ VISIT: HCPCS | Performed by: STUDENT IN AN ORGANIZED HEALTH CARE EDUCATION/TRAINING PROGRAM

## 2025-06-03 PROCEDURE — 1124F ACP DISCUSS-NO DSCNMKR DOCD: CPT | Performed by: STUDENT IN AN ORGANIZED HEALTH CARE EDUCATION/TRAINING PROGRAM

## 2025-06-03 PROCEDURE — 1159F MED LIST DOCD IN RCRD: CPT | Performed by: STUDENT IN AN ORGANIZED HEALTH CARE EDUCATION/TRAINING PROGRAM

## 2025-06-03 PROCEDURE — 1036F TOBACCO NON-USER: CPT | Performed by: STUDENT IN AN ORGANIZED HEALTH CARE EDUCATION/TRAINING PROGRAM

## 2025-06-03 PROCEDURE — 1170F FXNL STATUS ASSESSED: CPT | Performed by: STUDENT IN AN ORGANIZED HEALTH CARE EDUCATION/TRAINING PROGRAM

## 2025-06-03 PROCEDURE — 3078F DIAST BP <80 MM HG: CPT | Performed by: STUDENT IN AN ORGANIZED HEALTH CARE EDUCATION/TRAINING PROGRAM

## 2025-06-03 ASSESSMENT — ACTIVITIES OF DAILY LIVING (ADL)
MANAGING_FINANCES: INDEPENDENT
GROCERY_SHOPPING: INDEPENDENT
TAKING_MEDICATION: INDEPENDENT
BATHING: INDEPENDENT
DRESSING: INDEPENDENT
DOING_HOUSEWORK: INDEPENDENT

## 2025-06-03 ASSESSMENT — PATIENT HEALTH QUESTIONNAIRE - PHQ9
2. FEELING DOWN, DEPRESSED OR HOPELESS: NOT AT ALL
1. LITTLE INTEREST OR PLEASURE IN DOING THINGS: NOT AT ALL
2. FEELING DOWN, DEPRESSED OR HOPELESS: NOT AT ALL
SUM OF ALL RESPONSES TO PHQ9 QUESTIONS 1 AND 2: 0

## 2025-06-03 NOTE — PROGRESS NOTES
Subjective   Patient ID: Alyssa Pruett is a 79 y.o. female who presents for Medicare Annual Wellness Visit Subsequent (Medicare wellness visit /Gets itching under breasts and stomach at times- using an otc cream that is working well for her currently/Would like a mammogram order- last one has ).  History of Present Illness  The patient presents for evaluation of neck pain, dry skin, constipation, and health maintenance.    She reports a persistent soreness in her neck, which has been ongoing for an extended period. This discomfort is accompanied by an inability to lift her arms without experiencing pain. She does not experience any numbness in her hands.    She has been experiencing pruritus, which she attributes to dry skin. She maintains hydration by applying lotion daily. Her hygiene routine includes showering less frequently due to sensitivity to cold.    She has been unable to adhere to her iron supplement regimen due to associated constipation. She has recently incorporated more vitamins into her diet, including B complex and magnesium citrate.    Her sleep pattern is inconsistent, with some nights characterized by frequent awakenings to urinate, up to 2 or 3 times. She occasionally struggles to return to sleep, often remaining awake until 4:30 AM. She also reports taking long afternoon naps.    She has been experiencing irregular bowel movements, alternating between loose and hard stools. She has a history of a tortuous colon and is no longer a candidate for colonoscopies. She has undergone a procedure involving dye and imaging, which was unsuccessful. She occasionally resorts to laxatives and Smooth Move tea for relief.    She has been experiencing weight loss and reports a good appetite. She has increased her physical activity, including walking. She has been under the care of a cardiologist, who has expressed satisfaction with her progress. She has also consulted with Dr. Vaughn. She is due for a  "mammogram and has never undergone bone density screening. She receives regular eye exams.    PAST SURGICAL HISTORY:  - Three C-sections  - Hysterectomy    FAMILY HISTORY  Her daughter has thyroid issues.    Review of Systems  ROS otherwise negative aside from what was mentioned above in HPI.    Objective     /78 (BP Location: Right arm, Patient Position: Sitting, BP Cuff Size: Adult)   Pulse 90   Ht 1.549 m (5' 1\")   Wt 88.9 kg (196 lb)   SpO2 94% Comment: 3 liters  BMI 37.03 kg/m²      Current Outpatient Medications   Medication Instructions    albuterol 2.5 mg /3 mL (0.083 %) nebulizer solution Inhale.    ALBUTEROL SULFATE INHL 90 mcg    ascorbic acid/bioflavonoids (TAYLOR-C PO) 1 tablet, Daily    budesonide-glycopyr-formoterol (Breztri Aerosphere) 160-9-4.8 mcg/actuation HFA aerosol inhaler     cholecalciferol (Vitamin D-3) 50 mcg (2,000 unit) capsule Take by mouth.    cyanocobalamin (Vitamin B-12) 1,000 mcg tablet 1 tablet, Daily    omeprazole (PriLOSEC) 20 mg DR capsule TAKE 1 CAPSULE EVERY DAY EARLY IN THE MORNING    sertraline (Zoloft) 50 mg tablet TAKE 1 TABLET EVERY DAY    simvastatin (ZOCOR) 40 mg, oral, Daily    topiramate (TOPAMAX) 50 mg, oral, 2 times daily    tretinoin (Retin-A) 0.05 % cream Apply a thin layer to affected area at bedtime as tolerated.    triamterene-hydrochlorothiazid (Maxzide-25) 37.5-25 mg tablet 1 tablet, oral, Daily    ubidecarenone (coenzyme Q10) 100 mg tablet Take by mouth.       Physical Exam  Mouth/Throat: Mucous membranes moist, no erythema, no exudate  Neck: Supple, no abnormalities  Respiratory: Clear to auscultation, no wheezing, rales or rhonchi  Cardiovascular: Regular rate and rhythm, no murmurs, rubs, or gallops  Gastrointestinal: Soft, no tenderness, no distention, no masses  Extremities: No edema, no cyanosis    Results  Labs   - Thyroid hormone levels: Normal   - TSH levels: Fluctuated    Assessment & Plan  1. Neck pain.  - Persistent neck pain and " difficulty lifting arms, ongoing for a while.  - Differential diagnosis includes rotator cuff injury, deconditioning, autoimmune myopathy  - Physical examination performed; further evaluation may be needed if symptoms persist.  - No numbness in hands reported.    2. Dry skin.  - Severe dry skin and itching, improves with daily lubrication.  - Advised to continue using moisturizers regularly.  - Recommended to avoid frequent showers to prevent further dryness.  - Discussed impact of reduced shower frequency on skin condition.    3. Constipation.  - Constipation exacerbated by iron supplements.  - Advised to take iron supplements occasionally to mitigate constipation.  - Use of laxatives and teas like Smooth Move discussed.  - Should continue using laxatives and teas as needed.    4. Health maintenance.  - Mammogram order placed.  - Bone density screening ordered to assess for osteoporosis in lower back and hips.  - Laboratory tests for blood sugar, thyroid function, and iron levels ordered.  - Thyroid levels have fluctuated; thyroid antibody checked previously and was normal.    5. Insomnia.  - Difficulty sleeping, frequent nighttime urination, and occasional long afternoon naps.  - Advised to maintain a consistent sleep schedule.  - Recommended to limit naps to improve nighttime sleep quality.  - Discussed impact of boredom on napping and sleep cycle.        Farnaz Helm, DO     This medical note was created with the assistance of artificial intelligence (AI) for documentation purposes. The content has been reviewed and confirmed by the healthcare provider for accuracy and completeness. Patient consented to the use of audio recording and use of AI during their visit.

## 2025-06-04 LAB
EST. AVERAGE GLUCOSE BLD GHB EST-MCNC: 123 MG/DL
EST. AVERAGE GLUCOSE BLD GHB EST-SCNC: 6.8 MMOL/L
HBA1C MFR BLD: 5.9 %
IRON SATN MFR SERPL: 8 % (CALC) (ref 16–45)
IRON SERPL-MCNC: 36 MCG/DL (ref 45–160)
T4 FREE SERPL-MCNC: 1.1 NG/DL (ref 0.8–1.8)
TIBC SERPL-MCNC: 463 MCG/DL (CALC) (ref 250–450)
TSH SERPL-ACNC: 3.02 MIU/L (ref 0.4–4.5)

## 2025-09-04 ENCOUNTER — PATIENT MESSAGE (OUTPATIENT)
Dept: PRIMARY CARE | Facility: CLINIC | Age: 80
End: 2025-09-04
Payer: MEDICARE

## 2025-10-07 ENCOUNTER — APPOINTMENT (OUTPATIENT)
Dept: PRIMARY CARE | Facility: CLINIC | Age: 80
End: 2025-10-07
Payer: MEDICARE

## 2026-04-08 ENCOUNTER — APPOINTMENT (OUTPATIENT)
Dept: DERMATOLOGY | Facility: CLINIC | Age: 81
End: 2026-04-08
Payer: MEDICARE